# Patient Record
Sex: FEMALE | Race: BLACK OR AFRICAN AMERICAN | NOT HISPANIC OR LATINO | Employment: OTHER | URBAN - METROPOLITAN AREA
[De-identification: names, ages, dates, MRNs, and addresses within clinical notes are randomized per-mention and may not be internally consistent; named-entity substitution may affect disease eponyms.]

---

## 2023-10-08 PROBLEM — J44.9 CHRONIC OBSTRUCTIVE PULMONARY DISEASE (MULTI): Status: ACTIVE | Noted: 2023-10-08

## 2023-10-08 PROBLEM — R92.2 INCONCLUSIVE MAMMOGRAM: Status: ACTIVE | Noted: 2023-10-08

## 2023-10-08 PROBLEM — G89.29 CHRONIC PELVIC PAIN IN FEMALE: Status: ACTIVE | Noted: 2023-10-08

## 2023-10-08 PROBLEM — R91.8 LUNG NODULES: Status: ACTIVE | Noted: 2023-10-08

## 2023-10-08 PROBLEM — R14.0 ABDOMINAL BLOATING: Status: ACTIVE | Noted: 2023-10-08

## 2023-10-08 PROBLEM — J44.9 COPD, MODERATE (MULTI): Status: ACTIVE | Noted: 2023-10-08

## 2023-10-08 PROBLEM — J44.9 OBSTRUCTIVE LUNG DISEASE (MULTI): Status: ACTIVE | Noted: 2023-10-08

## 2023-10-08 PROBLEM — R10.9 ABDOMINAL CRAMPING: Status: ACTIVE | Noted: 2023-10-08

## 2023-10-08 PROBLEM — F17.210 NICOTINE DEPENDENCE, CIGARETTES, UNCOMPLICATED: Status: ACTIVE | Noted: 2023-10-08

## 2023-10-08 PROBLEM — G47.33 OBSTRUCTIVE SLEEP APNEA SYNDROME: Status: ACTIVE | Noted: 2023-10-08

## 2023-10-08 PROBLEM — E66.01 MORBID OBESITY (MULTI): Status: ACTIVE | Noted: 2023-10-08

## 2023-10-08 PROBLEM — R06.02 SOB (SHORTNESS OF BREATH) ON EXERTION: Status: ACTIVE | Noted: 2023-10-08

## 2023-10-08 PROBLEM — S01.312A: Status: ACTIVE | Noted: 2023-10-08

## 2023-10-08 PROBLEM — N84.1 CERVICAL POLYP: Status: ACTIVE | Noted: 2023-10-08

## 2023-10-08 PROBLEM — G47.21 DELAYED SLEEP PHASE SYNDROME: Status: ACTIVE | Noted: 2023-10-08

## 2023-10-08 PROBLEM — A59.01 TRICHOMONAS VAGINITIS: Status: ACTIVE | Noted: 2023-10-08

## 2023-10-08 PROBLEM — N95.0 POST-MENOPAUSAL BLEEDING: Status: ACTIVE | Noted: 2023-10-08

## 2023-10-08 PROBLEM — R29.818 SUSPECTED SLEEP APNEA: Status: ACTIVE | Noted: 2023-10-08

## 2023-10-08 PROBLEM — G47.00 INSOMNIA: Status: ACTIVE | Noted: 2023-10-08

## 2023-10-08 PROBLEM — R06.83 SNORING: Status: ACTIVE | Noted: 2023-10-08

## 2023-10-08 PROBLEM — E04.2 MULTINODULAR THYROID: Status: ACTIVE | Noted: 2023-10-08

## 2023-10-08 PROBLEM — D64.9 ANEMIA: Status: ACTIVE | Noted: 2023-10-08

## 2023-10-08 PROBLEM — K59.09 CHRONIC CONSTIPATION: Status: ACTIVE | Noted: 2023-10-08

## 2023-10-08 PROBLEM — E89.0 STATUS POST PARTIAL THYROIDECTOMY: Status: ACTIVE | Noted: 2023-10-08

## 2023-10-08 PROBLEM — S01.319A TEAR OF EARLOBE: Status: ACTIVE | Noted: 2023-10-08

## 2023-10-08 PROBLEM — J45.909 ASTHMA (HHS-HCC): Status: ACTIVE | Noted: 2023-10-08

## 2023-10-08 PROBLEM — R10.2 CHRONIC PELVIC PAIN IN FEMALE: Status: ACTIVE | Noted: 2023-10-08

## 2023-10-08 RX ORDER — NAPROXEN SODIUM 220 MG/1
TABLET, FILM COATED ORAL
COMMUNITY
Start: 2015-06-29

## 2023-10-08 RX ORDER — HYDROCHLOROTHIAZIDE 12.5 MG/1
12.5 CAPSULE ORAL DAILY
COMMUNITY

## 2023-10-08 RX ORDER — ATORVASTATIN CALCIUM 40 MG/1
TABLET, FILM COATED ORAL
COMMUNITY
Start: 2014-03-21

## 2023-10-08 RX ORDER — METOPROLOL SUCCINATE 100 MG/1
TABLET, EXTENDED RELEASE ORAL
COMMUNITY

## 2023-10-08 RX ORDER — ASPIRIN 325 MG
50000 TABLET, DELAYED RELEASE (ENTERIC COATED) ORAL
COMMUNITY
End: 2024-06-04 | Stop reason: WASHOUT

## 2023-10-08 RX ORDER — IBUPROFEN 600 MG/1
600 TABLET ORAL EVERY 8 HOURS
COMMUNITY
Start: 2018-11-01 | End: 2024-06-04 | Stop reason: WASHOUT

## 2023-10-08 RX ORDER — IPRATROPIUM BROMIDE AND ALBUTEROL SULFATE 2.5; .5 MG/3ML; MG/3ML
SOLUTION RESPIRATORY (INHALATION)
COMMUNITY

## 2023-10-08 RX ORDER — MIRTAZAPINE 15 MG/1
1 TABLET, FILM COATED ORAL NIGHTLY
COMMUNITY
Start: 2018-09-28 | End: 2024-06-04 | Stop reason: WASHOUT

## 2023-10-08 RX ORDER — DOCUSATE SODIUM 100 MG/1
CAPSULE, LIQUID FILLED ORAL
COMMUNITY

## 2023-10-08 RX ORDER — MONTELUKAST SODIUM 10 MG/1
1 TABLET ORAL NIGHTLY
COMMUNITY

## 2023-10-08 RX ORDER — ALBUTEROL SULFATE 0.83 MG/ML
SOLUTION RESPIRATORY (INHALATION)
COMMUNITY
Start: 2015-06-29

## 2023-10-08 RX ORDER — FLUTICASONE PROPIONATE AND SALMETEROL 250; 50 UG/1; UG/1
1 POWDER RESPIRATORY (INHALATION) EVERY 12 HOURS
COMMUNITY
Start: 2020-06-24

## 2023-10-08 RX ORDER — POLYETHYLENE GLYCOL 3350, SODIUM SULFATE ANHYDROUS, SODIUM BICARBONATE, SODIUM CHLORIDE, POTASSIUM CHLORIDE 236; 22.74; 6.74; 5.86; 2.97 G/4L; G/4L; G/4L; G/4L; G/4L
POWDER, FOR SOLUTION ORAL
COMMUNITY
Start: 2020-08-17 | End: 2024-06-04 | Stop reason: WASHOUT

## 2023-10-08 RX ORDER — OMEPRAZOLE 40 MG/1
CAPSULE, DELAYED RELEASE ORAL
COMMUNITY
Start: 2015-09-14

## 2023-10-08 RX ORDER — SIMETHICONE 125 MG
CAPSULE ORAL
COMMUNITY
End: 2024-06-04 | Stop reason: WASHOUT

## 2023-10-08 RX ORDER — DIPHENHYDRAMINE HCL 25 MG
25 CAPSULE ORAL 2 TIMES DAILY PRN
COMMUNITY

## 2023-10-08 RX ORDER — DULOXETIN HYDROCHLORIDE 60 MG/1
CAPSULE, DELAYED RELEASE ORAL
COMMUNITY
Start: 2016-12-15

## 2023-10-08 RX ORDER — INDOMETHACIN 50 MG/1
CAPSULE ORAL
COMMUNITY
Start: 2019-12-02 | End: 2024-06-04 | Stop reason: WASHOUT

## 2023-10-08 RX ORDER — BACITRACIN 500 [USP'U]/G
OINTMENT TOPICAL
COMMUNITY
End: 2024-06-04 | Stop reason: WASHOUT

## 2023-10-08 RX ORDER — FERROUS SULFATE 325(65) MG
TABLET ORAL
COMMUNITY
Start: 2020-08-17

## 2023-10-08 RX ORDER — FOLIC ACID 1 MG/1
TABLET ORAL
COMMUNITY

## 2023-10-08 RX ORDER — RANOLAZINE 500 MG/1
TABLET, EXTENDED RELEASE ORAL
COMMUNITY

## 2023-10-08 RX ORDER — DOXEPIN HYDROCHLORIDE 10 MG/1
10 CAPSULE ORAL NIGHTLY
COMMUNITY
Start: 2018-06-28 | End: 2024-06-04 | Stop reason: WASHOUT

## 2023-10-08 RX ORDER — TIOTROPIUM BROMIDE 18 UG/1
1 CAPSULE ORAL; RESPIRATORY (INHALATION)
COMMUNITY
Start: 2020-06-24 | End: 2024-06-04 | Stop reason: WASHOUT

## 2023-10-08 RX ORDER — HYDROCODONE BITARTRATE AND ACETAMINOPHEN 10; 325 MG/1; MG/1
1 TABLET ORAL EVERY 6 HOURS PRN
COMMUNITY
End: 2023-10-09 | Stop reason: ALTCHOICE

## 2023-10-08 RX ORDER — CALCIUM CARBONATE 500(1250)
1 TABLET ORAL DAILY
COMMUNITY
End: 2024-06-04 | Stop reason: WASHOUT

## 2023-10-08 RX ORDER — BUSPIRONE HYDROCHLORIDE 5 MG/1
TABLET ORAL
COMMUNITY
Start: 2017-10-11 | End: 2024-06-04 | Stop reason: WASHOUT

## 2023-10-10 ENCOUNTER — ANESTHESIA (OUTPATIENT)
Dept: GASTROENTEROLOGY | Facility: HOSPITAL | Age: 68
End: 2023-10-10
Payer: COMMERCIAL

## 2023-10-10 ENCOUNTER — ANESTHESIA EVENT (OUTPATIENT)
Dept: GASTROENTEROLOGY | Facility: HOSPITAL | Age: 68
End: 2023-10-10
Payer: COMMERCIAL

## 2023-10-10 ENCOUNTER — HOSPITAL ENCOUNTER (OUTPATIENT)
Dept: GASTROENTEROLOGY | Facility: HOSPITAL | Age: 68
Discharge: HOME | End: 2023-10-10
Payer: COMMERCIAL

## 2023-10-10 VITALS
HEART RATE: 98 BPM | DIASTOLIC BLOOD PRESSURE: 71 MMHG | RESPIRATION RATE: 19 BRPM | BODY MASS INDEX: 33.31 KG/M2 | HEIGHT: 63 IN | TEMPERATURE: 98.2 F | SYSTOLIC BLOOD PRESSURE: 124 MMHG | WEIGHT: 188 LBS | OXYGEN SATURATION: 100 %

## 2023-10-10 DIAGNOSIS — Z86.010 PERSONAL HISTORY OF COLONIC POLYPS: ICD-10-CM

## 2023-10-10 PROCEDURE — 3700000002 HC GENERAL ANESTHESIA TIME - EACH INCREMENTAL 1 MINUTE

## 2023-10-10 PROCEDURE — 2500000005 HC RX 250 GENERAL PHARMACY W/O HCPCS: Performed by: NURSE ANESTHETIST, CERTIFIED REGISTERED

## 2023-10-10 PROCEDURE — 3700000001 HC GENERAL ANESTHESIA TIME - INITIAL BASE CHARGE

## 2023-10-10 PROCEDURE — 2500000004 HC RX 250 GENERAL PHARMACY W/ HCPCS (ALT 636 FOR OP/ED): Performed by: NURSE ANESTHETIST, CERTIFIED REGISTERED

## 2023-10-10 PROCEDURE — A45378 PR COLONOSCOPY,DIAGNOSTIC: Performed by: ANESTHESIOLOGY

## 2023-10-10 PROCEDURE — 7100000009 HC PHASE TWO TIME - INITIAL BASE CHARGE

## 2023-10-10 PROCEDURE — A45378 PR COLONOSCOPY,DIAGNOSTIC: Performed by: NURSE ANESTHETIST, CERTIFIED REGISTERED

## 2023-10-10 PROCEDURE — 7100000010 HC PHASE TWO TIME - EACH INCREMENTAL 1 MINUTE

## 2023-10-10 PROCEDURE — 45378 DIAGNOSTIC COLONOSCOPY: CPT | Performed by: INTERNAL MEDICINE

## 2023-10-10 RX ORDER — LIDOCAINE HYDROCHLORIDE 20 MG/ML
INJECTION, SOLUTION EPIDURAL; INFILTRATION; INTRACAUDAL; PERINEURAL AS NEEDED
Status: DISCONTINUED | OUTPATIENT
Start: 2023-10-10 | End: 2023-10-10

## 2023-10-10 RX ORDER — FENTANYL CITRATE 50 UG/ML
INJECTION, SOLUTION INTRAMUSCULAR; INTRAVENOUS AS NEEDED
Status: DISCONTINUED | OUTPATIENT
Start: 2023-10-10 | End: 2023-10-10

## 2023-10-10 RX ORDER — DIPHENHYDRAMINE HYDROCHLORIDE 50 MG/ML
12.5 INJECTION INTRAMUSCULAR; INTRAVENOUS ONCE AS NEEDED
Status: DISCONTINUED | OUTPATIENT
Start: 2023-10-10 | End: 2023-10-20 | Stop reason: HOSPADM

## 2023-10-10 RX ORDER — PROPOFOL 10 MG/ML
INJECTION, EMULSION INTRAVENOUS CONTINUOUS PRN
Status: DISCONTINUED | OUTPATIENT
Start: 2023-10-10 | End: 2023-10-10

## 2023-10-10 RX ORDER — LIDOCAINE HYDROCHLORIDE 10 MG/ML
0.1 INJECTION, SOLUTION EPIDURAL; INFILTRATION; INTRACAUDAL; PERINEURAL ONCE
Status: DISCONTINUED | OUTPATIENT
Start: 2023-10-10 | End: 2023-10-20 | Stop reason: HOSPADM

## 2023-10-10 RX ORDER — ONDANSETRON HYDROCHLORIDE 2 MG/ML
4 INJECTION, SOLUTION INTRAVENOUS ONCE AS NEEDED
Status: DISCONTINUED | OUTPATIENT
Start: 2023-10-10 | End: 2023-10-20 | Stop reason: HOSPADM

## 2023-10-10 RX ORDER — MIDAZOLAM HYDROCHLORIDE 1 MG/ML
INJECTION INTRAMUSCULAR; INTRAVENOUS AS NEEDED
Status: DISCONTINUED | OUTPATIENT
Start: 2023-10-10 | End: 2023-10-10

## 2023-10-10 RX ORDER — OXYCODONE HYDROCHLORIDE 5 MG/1
5 TABLET ORAL EVERY 4 HOURS PRN
Status: DISCONTINUED | OUTPATIENT
Start: 2023-10-10 | End: 2023-10-20 | Stop reason: HOSPADM

## 2023-10-10 RX ORDER — SODIUM CHLORIDE, SODIUM LACTATE, POTASSIUM CHLORIDE, CALCIUM CHLORIDE 600; 310; 30; 20 MG/100ML; MG/100ML; MG/100ML; MG/100ML
100 INJECTION, SOLUTION INTRAVENOUS CONTINUOUS
Status: DISCONTINUED | OUTPATIENT
Start: 2023-10-10 | End: 2023-10-20 | Stop reason: HOSPADM

## 2023-10-10 RX ADMIN — LIDOCAINE HYDROCHLORIDE 60 MG: 20 INJECTION, SOLUTION EPIDURAL; INFILTRATION; INTRACAUDAL; PERINEURAL at 17:30

## 2023-10-10 RX ADMIN — MIDAZOLAM HYDROCHLORIDE 2 MG: 1 INJECTION, SOLUTION INTRAMUSCULAR; INTRAVENOUS at 17:26

## 2023-10-10 RX ADMIN — FENTANYL CITRATE 50 MCG: 50 INJECTION, SOLUTION INTRAMUSCULAR; INTRAVENOUS at 17:30

## 2023-10-10 RX ADMIN — FENTANYL CITRATE 50 MCG: 50 INJECTION, SOLUTION INTRAMUSCULAR; INTRAVENOUS at 17:35

## 2023-10-10 RX ADMIN — PROPOFOL 100 MCG/KG/MIN: 10 INJECTION, EMULSION INTRAVENOUS at 17:28

## 2023-10-10 SDOH — HEALTH STABILITY: MENTAL HEALTH: CURRENT SMOKER: 1

## 2023-10-10 ASSESSMENT — PAIN SCALES - GENERAL
PAINLEVEL_OUTOF10: 0 - NO PAIN
PAIN_LEVEL: 0
PAINLEVEL_OUTOF10: 0 - NO PAIN
PAINLEVEL_OUTOF10: 0 - NO PAIN

## 2023-10-10 ASSESSMENT — COLUMBIA-SUICIDE SEVERITY RATING SCALE - C-SSRS
6. HAVE YOU EVER DONE ANYTHING, STARTED TO DO ANYTHING, OR PREPARED TO DO ANYTHING TO END YOUR LIFE?: NO
1. IN THE PAST MONTH, HAVE YOU WISHED YOU WERE DEAD OR WISHED YOU COULD GO TO SLEEP AND NOT WAKE UP?: NO
2. HAVE YOU ACTUALLY HAD ANY THOUGHTS OF KILLING YOURSELF?: NO

## 2023-10-10 ASSESSMENT — PAIN - FUNCTIONAL ASSESSMENT
PAIN_FUNCTIONAL_ASSESSMENT: 0-10

## 2023-10-10 NOTE — DISCHARGE INSTRUCTIONS

## 2023-10-10 NOTE — ANESTHESIA POSTPROCEDURE EVALUATION
Patient: Nancy Samuel    Procedure Summary       Date: 10/10/23 Room / Location: [facility]    Anesthesia Start: 1726 Anesthesia Stop: 1806    Procedure: COLONOSCOPY Diagnosis: Personal history of colonic polyps    Scheduled Providers: Zaki Adams MD; Rayray Walters MD; Amber Lombardo, RN Responsible Provider: Aydin Turner MD    Anesthesia Type: MAC ASA Status: 2            Anesthesia Type: MAC    Vitals Value Taken Time   /76 10/10/23 1806   Temp 36.3 10/10/23 1806   Pulse 80 10/10/23 1806   Resp 20 10/10/23 1806   SpO2 98 10/10/23 1806       Anesthesia Post Evaluation    Patient location during evaluation: PACU  Patient participation: complete - patient participated  Level of consciousness: awake and alert  Pain score: 0  Cardiovascular status: acceptable, blood pressure returned to baseline and hemodynamically stable  Respiratory status: acceptable and room air  Hydration status: acceptable        No notable events documented.

## 2023-10-10 NOTE — ANESTHESIA PREPROCEDURE EVALUATION
Patient: Nancy Samuel    Procedure Information       Date/Time: 10/10/23 1530    Scheduled providers: Addison Price MD; Rayray Walters MD; Luis Felipe Stoner RN    Procedure: COLONOSCOPY    Location: [facility]          67 yo F hx COPD (controlled), HTN, OA, TIA, depression, tobacco abuse s/f colonoscopy.  No issues with anethesia in past    Relevant Problems   Endocrine   (+) Morbid obesity (CMS/HCC)   (+) Multinodular thyroid      Pulmonary   (+) Asthma   (+) COPD, moderate (CMS/HCC)   (+) Chronic obstructive pulmonary disease (CMS/HCC)   (+) Lung nodules   (+) Obstructive sleep apnea syndrome   (+) SOB (shortness of breath) on exertion      Hematology   (+) Anemia      Infectious Disease   (+) Trichomonas vaginitis       Clinical information reviewed:   Tobacco  Allergies  Meds   Med Hx  Surg Hx  OB Status  Fam Hx  Soc   Hx        NPO Detail:  NPO/Void Status  Carbonhydrate Drink Given Prior to Surgery? : N  Date of Last Liquid: 10/10/23  Time of Last Liquid: 1100  Date of Last Solid: 10/09/23  Time of Last Solid: 1000  Last Intake Type: Light meal  Time of Last Void: 1500         Physical Exam    Airway  Mallampati: III  TM distance: >3 FB  Neck ROM: full  Comments: thick   Cardiovascular   Rate: normal     Dental   Comments: Poor dentition.  Nothing loose   Pulmonary   Breath sounds clear to auscultation     Abdominal            Anesthesia Plan    ASA 2     MAC     The patient is a current smoker.    intravenous induction   Anesthetic plan and risks discussed with patient.    Plan discussed with CRNA.

## 2023-10-10 NOTE — PERIOPERATIVE NURSING NOTE
Discharge instructions reviewed with patient and responsible adult via phone call. Any/ all questions reviewed. Patient dressed with assistance. Patient t transferred off the floor via wheelchair.

## 2023-10-10 NOTE — H&P
History Of Present Illness  Nancy Samuel is a 68 y.o. female presenting with colonoscopy for  follow up of colon polyps     Past Medical History  Past Medical History:   Diagnosis Date   • Other disorders of lung     Lung trouble   • Personal history of other diseases of the circulatory system     History of hypertension   • Personal history of other diseases of the musculoskeletal system and connective tissue     History of arthritis   • Personal history of other diseases of the respiratory system     History of chronic obstructive lung disease   • Personal history of other mental and behavioral disorders     History of depression   • Personal history of other specified conditions     History of heartburn   • Personal history of other specified conditions     History of edema   • Polyp of cervix uteri 11/04/2014    Cervical polyp       Surgical History  Past Surgical History:   Procedure Laterality Date   • MR HEAD ANGIO WO IV CONTRAST  12/6/2013    MR HEAD ANGIO WO IV CONTRAST 12/6/2013 [facility]Y [facility]GAC[facility]   • MR NECK ANGIO WO IV CONTRAST  12/6/2013    MR NECK ANGIO WO IV CONTRAST 12/6/2013 [facility] EMERGENCY LEGAC[facility]   • OTHER SURGICAL HISTORY  11/01/2022    Dilation and curettage   • OTHER SURGICAL HISTORY  11/01/2022    Tubal ligation bilateral   • OTHER SURGICAL HISTORY  02/27/2019    Umbilical hernia repair   • SPINAL FUSION  08/12/2014    Spinal Arthrodesis        Social History  She reports that she has been smoking cigarettes. She has a 7.50 pack-year smoking history. She has never used smokeless tobacco. She reports current alcohol use of about 4.0 standard drinks of alcohol per week. She reports that she does not use drugs.    Family History  Family History   Problem Relation Name Age of Onset   • Other (Back Problems) Mother     • Stomach cancer Father     • Other (Neck Problems) Father     • Heart disease Sister          Allergies  House dust, Iodine, Latex, Morphine, Penicillins, Propoxyphene, and  "Valsartan         Physical Exam  Eyes:      Conjunctiva/sclera: Conjunctivae normal.   Cardiovascular:      Rate and Rhythm: Normal rate.   Pulmonary:      Effort: Pulmonary effort is normal.      Breath sounds: Normal breath sounds.   Abdominal:      Palpations: Abdomen is soft.   Skin:     General: Skin is warm.   Neurological:      Mental Status: She is alert and oriented to person, place, and time.   Psychiatric:         Mood and Affect: Mood normal.        Last Recorded Vitals  Blood pressure 131/80, pulse 95, temperature 36.2 °C (97.2 °F), temperature source Temporal, resp. rate 18, height 1.6 m (5' 3\"), weight 85.3 kg (188 lb), SpO2 100 %.    Relevant Results             Assessment/Plan   Active Problems:  There are no active Hospital Problems.      History of hyperplastic colon polyps ([facility] 4/28/08 which revealed rectal hyperplastic polyp and internal hemorrhoids with repeat colonoscopy recommended in 5 years for unknown reasons; Dr. Leo 5/4/15 which revealed diverticulosis in sigmoid and descending colon with no polyps with repeat colonoscopy recommended in 10 years (2025); and, Dr. Sal 7/6/18 which revealed 2 rectal hyperplastic polyps with repeat colonoscopy recommended in 5 to 10 years (unclear why this particular date range rather than 10 years).       Colonoscopy.      Addison Price MD    "

## 2023-10-11 ASSESSMENT — PAIN SCALES - GENERAL: PAINLEVEL_OUTOF10: 0 - NO PAIN

## 2023-11-10 ENCOUNTER — HOSPITAL ENCOUNTER (OUTPATIENT)
Dept: RADIOLOGY | Facility: HOSPITAL | Age: 68
Setting detail: SURGERY ADMIT
Discharge: HOME | End: 2023-11-10
Payer: COMMERCIAL

## 2023-11-10 DIAGNOSIS — R92.8 OTHER ABNORMAL AND INCONCLUSIVE FINDINGS ON DIAGNOSTIC IMAGING OF BREAST: ICD-10-CM

## 2023-11-10 PROCEDURE — 76642 ULTRASOUND BREAST LIMITED: CPT | Mod: 50

## 2023-11-10 PROCEDURE — 76642 ULTRASOUND BREAST LIMITED: CPT | Mod: BILATERAL PROCEDURE | Performed by: RADIOLOGY

## 2023-11-10 PROCEDURE — 76982 USE 1ST TARGET LESION: CPT

## 2023-12-13 NOTE — PROGRESS NOTES
"Division of Minimally Invasive Gynecologic Surgery  [facility]    12/13/23 Gynecology Visit    CC: Annual/PMB follow up    Nancy Samuel is a 68 y.o. presents in follow up for PMB. She underwent D+C, hysteroscopy on 1/12/23. Sampling had no endometrial component.     She denies PMB since D+C. No other concerns today.     Last pap: 2022 negative/negative   Last mammogram: 10/2023 BIRADs 3, 6 month follow up recommended   Last colonoscopy: 10/2023 unremarkable, repeat recommended in 10 years   Recent CMP WNL, CBC w/ Hgb 11.3   Recent TSH WNL, A1C 5.2%     PMHx, PSHx, SHx, Allergies, and Medications updated in Epic.    ROS: reviewed and negative    PE: Ht 1.6 m (5' 3\")   Wt 86.6 kg (191 lb)   BMI 33.83 kg/m²    Constitutional:  No acute distress, well-nourished and well-developed  HEENT: EOM grossly intact, MMM, neck supple and with full ROM  Pulm:  Effort normal. No accessory muscle usage.  No respiratory distress.  :  - EGBUS: grossly WNL  - Speculum: vaginal and cervical mucosa grossly WNL, no trauma or lesions  Neurological:  She is alert and oriented to person place and time.  Skin: Warm, no pallor.  Psychiatric:  She has normal mood and affect.    A/P: Nancy Samuel is a 68 y.o. presents in follow up for PMB. She underwent D+C, hysteroscopy on 1/12/23. Sampling had no endometrial component.  - Doing well, no concerns today  - Up to date on screening, aware of need for follow up breast screening   - Benign pelvic exam   - RTC for annual w/ Dr. Holguin in one year     Denice García MD  Division of Minimally Invasive Gynecologic Surgery  [facility]    "

## 2023-12-20 ENCOUNTER — OFFICE VISIT (OUTPATIENT)
Dept: OBSTETRICS AND GYNECOLOGY | Facility: CLINIC | Age: 68
End: 2023-12-20
Payer: COMMERCIAL

## 2023-12-20 VITALS — BODY MASS INDEX: 33.84 KG/M2 | WEIGHT: 191 LBS | HEIGHT: 63 IN

## 2023-12-20 DIAGNOSIS — N95.0 POST-MENOPAUSAL BLEEDING: Primary | ICD-10-CM

## 2023-12-20 DIAGNOSIS — Z00.00 HEALTHCARE MAINTENANCE: ICD-10-CM

## 2023-12-20 PROCEDURE — 1159F MED LIST DOCD IN RCRD: CPT | Performed by: STUDENT IN AN ORGANIZED HEALTH CARE EDUCATION/TRAINING PROGRAM

## 2023-12-20 PROCEDURE — 99214 OFFICE O/P EST MOD 30 MIN: CPT | Performed by: STUDENT IN AN ORGANIZED HEALTH CARE EDUCATION/TRAINING PROGRAM

## 2023-12-20 PROCEDURE — 1125F AMNT PAIN NOTED PAIN PRSNT: CPT | Performed by: STUDENT IN AN ORGANIZED HEALTH CARE EDUCATION/TRAINING PROGRAM

## 2023-12-20 PROCEDURE — 3008F BODY MASS INDEX DOCD: CPT | Performed by: STUDENT IN AN ORGANIZED HEALTH CARE EDUCATION/TRAINING PROGRAM

## 2023-12-20 ASSESSMENT — PAIN SCALES - GENERAL: PAINLEVEL: 7

## 2023-12-22 ENCOUNTER — APPOINTMENT (OUTPATIENT)
Dept: OBSTETRICS AND GYNECOLOGY | Facility: CLINIC | Age: 68
End: 2023-12-22
Payer: COMMERCIAL

## 2024-01-04 ENCOUNTER — LAB (OUTPATIENT)
Dept: LAB | Facility: LAB | Age: 69
End: 2024-01-04
Payer: COMMERCIAL

## 2024-01-04 DIAGNOSIS — I44.0 ATRIOVENTRICULAR BLOCK, FIRST DEGREE: ICD-10-CM

## 2024-01-04 DIAGNOSIS — E11.59 HIGH BLOOD PRESSURE ASSOCIATED WITH DIABETES (MULTI): ICD-10-CM

## 2024-01-04 DIAGNOSIS — I15.2 HIGH BLOOD PRESSURE ASSOCIATED WITH DIABETES (MULTI): ICD-10-CM

## 2024-01-04 DIAGNOSIS — E55.9 VITAMIN D DEFICIENCY, UNSPECIFIED: ICD-10-CM

## 2024-01-04 DIAGNOSIS — I11.9 HYPERTENSIVE HEART DISEASE WITHOUT HEART FAILURE: Primary | ICD-10-CM

## 2024-01-04 LAB
25(OH)D3 SERPL-MCNC: 39 NG/ML (ref 30–100)
ALBUMIN SERPL BCP-MCNC: 4.2 G/DL (ref 3.4–5)
ALP SERPL-CCNC: 74 U/L (ref 33–136)
ALT SERPL W P-5'-P-CCNC: 13 U/L (ref 7–45)
ANION GAP SERPL CALC-SCNC: 11 MMOL/L (ref 10–20)
AST SERPL W P-5'-P-CCNC: 14 U/L (ref 9–39)
BILIRUB SERPL-MCNC: 0.3 MG/DL (ref 0–1.2)
BUN SERPL-MCNC: 14 MG/DL (ref 6–23)
CALCIUM SERPL-MCNC: 9.4 MG/DL (ref 8.6–10.6)
CHLORIDE SERPL-SCNC: 101 MMOL/L (ref 98–107)
CHOLEST SERPL-MCNC: 88 MG/DL (ref 0–199)
CHOLESTEROL/HDL RATIO: 2
CO2 SERPL-SCNC: 32 MMOL/L (ref 21–32)
CREAT SERPL-MCNC: 0.75 MG/DL (ref 0.5–1.05)
ERYTHROCYTE [DISTWIDTH] IN BLOOD BY AUTOMATED COUNT: 13.2 % (ref 11.5–14.5)
EST. AVERAGE GLUCOSE BLD GHB EST-MCNC: 120 MG/DL
GFR SERPL CREATININE-BSD FRML MDRD: 87 ML/MIN/1.73M*2
GLUCOSE SERPL-MCNC: 93 MG/DL (ref 74–99)
HBA1C MFR BLD: 5.8 %
HCT VFR BLD AUTO: 33.8 % (ref 36–46)
HDLC SERPL-MCNC: 43 MG/DL
HGB BLD-MCNC: 10.6 G/DL (ref 12–16)
IRON SERPL-MCNC: 57 UG/DL (ref 35–150)
LDLC SERPL CALC-MCNC: 19 MG/DL
MCH RBC QN AUTO: 31.4 PG (ref 26–34)
MCHC RBC AUTO-ENTMCNC: 31.4 G/DL (ref 32–36)
MCV RBC AUTO: 100 FL (ref 80–100)
NON HDL CHOLESTEROL: 45 MG/DL (ref 0–149)
NRBC BLD-RTO: 0 /100 WBCS (ref 0–0)
PLATELET # BLD AUTO: 421 X10*3/UL (ref 150–450)
POTASSIUM SERPL-SCNC: 4.4 MMOL/L (ref 3.5–5.3)
PROT SERPL-MCNC: 7.2 G/DL (ref 6.4–8.2)
RBC # BLD AUTO: 3.38 X10*6/UL (ref 4–5.2)
SODIUM SERPL-SCNC: 140 MMOL/L (ref 136–145)
TRIGL SERPL-MCNC: 131 MG/DL (ref 0–149)
TSH SERPL-ACNC: 3.93 MIU/L (ref 0.44–3.98)
VLDL: 26 MG/DL (ref 0–40)
WBC # BLD AUTO: 8.3 X10*3/UL (ref 4.4–11.3)

## 2024-01-04 PROCEDURE — 83036 HEMOGLOBIN GLYCOSYLATED A1C: CPT

## 2024-01-04 PROCEDURE — 80053 COMPREHEN METABOLIC PANEL: CPT

## 2024-01-04 PROCEDURE — 84443 ASSAY THYROID STIM HORMONE: CPT

## 2024-01-04 PROCEDURE — 80061 LIPID PANEL: CPT

## 2024-01-04 PROCEDURE — 85027 COMPLETE CBC AUTOMATED: CPT

## 2024-01-04 PROCEDURE — 83540 ASSAY OF IRON: CPT

## 2024-01-04 PROCEDURE — 36415 COLL VENOUS BLD VENIPUNCTURE: CPT

## 2024-01-04 PROCEDURE — 82306 VITAMIN D 25 HYDROXY: CPT

## 2024-05-16 ENCOUNTER — HOSPITAL ENCOUNTER (OUTPATIENT)
Dept: RADIOLOGY | Facility: HOSPITAL | Age: 69
Discharge: HOME | End: 2024-05-16
Payer: COMMERCIAL

## 2024-05-16 VITALS — WEIGHT: 190.92 LBS | HEIGHT: 63 IN | BODY MASS INDEX: 33.83 KG/M2

## 2024-05-16 DIAGNOSIS — Z12.31 ENCOUNTER FOR SCREENING MAMMOGRAM FOR MALIGNANT NEOPLASM OF BREAST: ICD-10-CM

## 2024-05-16 DIAGNOSIS — R92.8 OTHER ABNORMAL AND INCONCLUSIVE FINDINGS ON DIAGNOSTIC IMAGING OF BREAST: ICD-10-CM

## 2024-05-16 PROCEDURE — G0279 TOMOSYNTHESIS, MAMMO: HCPCS | Performed by: STUDENT IN AN ORGANIZED HEALTH CARE EDUCATION/TRAINING PROGRAM

## 2024-05-16 PROCEDURE — 77066 DX MAMMO INCL CAD BI: CPT | Performed by: STUDENT IN AN ORGANIZED HEALTH CARE EDUCATION/TRAINING PROGRAM

## 2024-05-16 PROCEDURE — 76982 USE 1ST TARGET LESION: CPT | Mod: 59

## 2024-05-16 PROCEDURE — 77062 BREAST TOMOSYNTHESIS BI: CPT

## 2024-05-16 PROCEDURE — 76642 ULTRASOUND BREAST LIMITED: CPT | Performed by: STUDENT IN AN ORGANIZED HEALTH CARE EDUCATION/TRAINING PROGRAM

## 2024-05-16 PROCEDURE — 76642 ULTRASOUND BREAST LIMITED: CPT | Mod: 50

## 2024-05-22 ENCOUNTER — HOSPITAL ENCOUNTER (OUTPATIENT)
Dept: RESPIRATORY THERAPY | Facility: CLINIC | Age: 69
Discharge: HOME | End: 2024-05-22
Payer: COMMERCIAL

## 2024-05-22 DIAGNOSIS — R06.02 SOB (SHORTNESS OF BREATH): ICD-10-CM

## 2024-05-22 PROCEDURE — 94060 EVALUATION OF WHEEZING: CPT

## 2024-05-22 PROCEDURE — 94727 GAS DIL/WSHOT DETER LNG VOL: CPT

## 2024-05-22 PROCEDURE — 94729 DIFFUSING CAPACITY: CPT

## 2024-05-23 ENCOUNTER — HOSPITAL ENCOUNTER (OUTPATIENT)
Dept: RADIOLOGY | Facility: HOSPITAL | Age: 69
Discharge: HOME | End: 2024-05-23
Payer: COMMERCIAL

## 2024-05-23 DIAGNOSIS — F17.210 NICOTINE DEPENDENCE, CIGARETTES, UNCOMPLICATED: ICD-10-CM

## 2024-05-23 DIAGNOSIS — J44.1 CHRONIC OBSTRUCTIVE PULMONARY DISEASE WITH (ACUTE) EXACERBATION (MULTI): ICD-10-CM

## 2024-05-23 DIAGNOSIS — R06.02 SOB (SHORTNESS OF BREATH): ICD-10-CM

## 2024-05-23 PROCEDURE — 71250 CT THORAX DX C-: CPT

## 2024-05-23 PROCEDURE — 71250 CT THORAX DX C-: CPT | Performed by: RADIOLOGY

## 2024-05-24 ENCOUNTER — HOSPITAL ENCOUNTER (OUTPATIENT)
Dept: RADIOLOGY | Facility: HOSPITAL | Age: 69
End: 2024-05-24
Payer: COMMERCIAL

## 2024-06-03 NOTE — PROGRESS NOTES
Nancy Samuel female   1955 68 y.o.   [id number]      Chief Complaint  New patient, abnormal breast imaging     History Of Present Illness  Nancy Samuel is a pleasant 68 y.o. AA female presenting for follow up on abnormal imaging. She denies breast biopsy or surgery. She denies family history breast cancer.      BREAST IMAGIN2023 Bilateral screening mammogram, BI-RADS Category 0, Bilateral breast masses, right breast asymmetry and left breast focal   Asymmetry. 3/7/2023  Bilateral diagnostic mammogram and ultrasound, BI-RADS Category 3,  RIGHT: 10:00 5 cm from the nipple, 0.4 x 0.5 x  0.3 cm, soft oval, circumscribed, hypoechoic mass,  probably benign and correlates with the right breast mass on mammogram.  Left breast: 12:00 3 cm from the nipple, 0.7 x 0.8 x  0.3 cm an oval, circumscribed, hypoechoic mass is It is avascular and soft on elastography, It is probably  benign and correlates with the left breast mass on mammogram. Interval resolution of right breast asymmetry and left breast focal asymmetry.    11/10/2023 Bilateral breast ultrasound, BI-RADS Category 3, Stable probably benign bilateral breast masses. Recommend a 2nd six-month follow-up bilateral breast ultrasound in May 2024. The patient is due for her annual screening mammography in 2024.     2024 Bilateral diagnostic mammogram, BI-RADS Category 3, Stable probably benign bilateral breast masses documenting 1 year of  stability. Final short-term follow-up ultrasound in 1 year is recommended at the time of patient's bilateral annual mammogram.           REPRODUCTIVE HISTORY: menarche age unknown, , first birth age 17, did not breastfeed ,no  OCP's, scattered fibroglandular tissue                                    FAMILY CANCER HISTORY:   Father: Stomach cancer     Surgical History  She has a past surgical history that includes Spinal fusion (2014); Other surgical history (2022); Other surgical history  (11/01/2022); MR angio head wo IV contrast (12/6/2013); MR angio neck wo IV contrast (12/6/2013); and Other surgical history (02/27/2019).     Social History  She reports that she has been smoking cigarettes. She has a 7.5 pack-year smoking history. She has never used smokeless tobacco. She reports current alcohol use of about 4.0 standard drinks of alcohol per week. She reports that she does not use drugs.    Family History  Family History   Problem Relation Name Age of Onset    Other (Back Problems) Mother      Stomach cancer Father      Other (Neck Problems) Father      Heart disease Sister          Allergies  House dust, Iodine, Latex, Morphine, Penicillins, Propoxyphene, and Valsartan    Medications  Current Outpatient Medications   Medication Instructions    albuterol 2.5 mg /3 mL (0.083 %) nebulizer solution USE 1 UNIT DOSE EVERY 4-6 HOURS AS NEEDED FOR WHEEZING .    amLODIPine (NORVASC) 2.5 mg, oral, Daily    aspirin 81 mg chewable tablet oral    atorvastatin (Lipitor) 40 mg tablet oral    bacitracin 500 unit/gram ointment Bacitracin 500 UNIT/GM External Ointment   Refills: 0       Active    busPIRone (Buspar) 5 mg tablet TAKE 1 TABLET BY MOUTH EVERY DAY FOR 14 DAYS THEN INCREASE TO 2 TIMES    calcium carbonate (Oyster Shell Calcium 500) 500 mg calcium (1,250 mg) tablet 1 tablet, oral, Daily    cholecalciferol (VITAMIN D-3) 50,000 Units, oral, Once Weekly    cyanocobalamin (Vitamin B-12) 50 mcg tablet 1 tablet, oral, Daily    cyclobenzaprine (Flexeril) 10 mg tablet     diclofenac sodium 1 % kit Topical, 2 times daily PRN    diphenhydrAMINE (BENADRYL) 25 mg, oral, 2 times daily PRN    docusate sodium (Colace) 100 mg capsule oral    doxepin (SINEQUAN) 10 mg, oral, Nightly    DULoxetine (Cymbalta) 60 mg DR capsule oral    ferrous sulfate 325 (65 Fe) MG tablet oral    fluticasone propion-salmeteroL (Wixela Inhub) 250-50 mcg/dose diskus inhaler 1 puff, inhalation, Every 12 hours    folic acid (Folvite) 1 mg  tablet oral    hydroCHLOROthiazide (MICROZIDE) 12.5 mg, oral, Daily    ibuprofen 600 mg, oral, Every 8 hours, For 5 days then as needed    indomethacin (Indocin) 50 mg capsule oral    ipratropium-albuteroL (Duo-Neb) 0.5-2.5 mg/3 mL nebulizer solution inhalation    Lyrica 25 mg capsule 1 capsule, Every 24 hours    metoprolol succinate XL (Toprol-XL) 100 mg 24 hr tablet oral    metoprolol succinate XL (TOPROL-XL) 50 mg, oral, Nightly    mirtazapine (Remeron) 15 mg tablet 1 tablet, oral, Nightly    montelukast (Singulair) 10 mg tablet 1 tablet, oral, Nightly    Movantik 12.5 mg, oral, Daily before breakfast    omeprazole (PriLOSEC) 40 mg DR capsule oral    polyethylene glycol-electrolytes (polyethylene glycol) 420 gram solution oral    ranolazine (Ranexa) 500 mg 12 hr tablet oral    simethicone (Mylicon,Gas-X) 125 mg capsule oral    tiotropium (Spiriva with HandiHaler) 18 mcg inhalation capsule 1 capsule, inhalation, Daily RT         REVIEW OF SYSTEMS    Constitutional:  Negative for appetite change, fatigue, fever and unexpected weight change.   HENT:  Negative for ear pain, hearing loss, nosebleeds, sore throat and trouble swallowing.    Eyes:  Negative for discharge, itching and visual disturbance.   Respiratory:  Negative for cough, chest tightness and shortness of breath.    Cardiovascular:  Negative for chest pain, palpitations and leg swelling.   Breast: as indicated in HPI  Gastrointestinal:  Negative for abdominal pain, constipation, diarrhea and nausea.   Endocrine: Negative for cold intolerance and heat intolerance.   Genitourinary:  Negative for dysuria, frequency, hematuria, pelvic pain and vaginal bleeding.   Musculoskeletal:  Negative for arthralgias, back pain, gait problem, joint swelling and myalgias.   Skin:  Negative for color change and rash.   Allergic/Immunologic: Negative for environmental allergies and food allergies.   Neurological:  Negative for dizziness, tremors, speech difficulty,  weakness, numbness and headaches.   Hematological:  Does not bruise/bleed easily.   Psychiatric/Behavioral:  Negative for agitation, dysphoric mood and sleep disturbance. The patient is not nervous/anxious.         Past Medical History  She has a past medical history of Other disorders of lung, Personal history of other diseases of the circulatory system, Personal history of other diseases of the musculoskeletal system and connective tissue, Personal history of other diseases of the respiratory system, Personal history of other mental and behavioral disorders, Personal history of other specified conditions, Personal history of other specified conditions, and Polyp of cervix uteri (11/04/2014).     Physical Exam  Patient is alert and oriented x3 and in a relaxed and appropriate mood. Her gait is steady and hand grasps are equal. Sclera is clear. The breasts are nearly symmetrical. The tissue is soft without palpable abnormalities, discrete nodules or masses. The skin and nipples appear normal. There is no cervical, supraclavicular or axillary lymphadenopathy. Heart rate and rhythm normal, S1 and S2 appreciated. The lungs are clear to auscultation bilaterally. Abdomen is soft and non-tender.       Physical Exam     Last Recorded Vitals  Vitals:    06/04/24 1508   BP: 137/86   Pulse: 96   Resp: 18   Temp: 36.3 °C (97.3 °F)   SpO2: 90%       Relevant Results   Time was spent viewing digital images of the radiology testing with the patient. I explained the results in depth, along with suggested explanation for follow up recommendations based on the testing results. BI-RADS Category 3         Assessment/Plan       Stable clinical exam and imaging, no history breast biopsy or surgery, no family history breast cancer, scattered fibroglandular tissue    Patient Discussion/Summary  Your clinical examination and imaging are stable. Please return in one year for bilateral diagnostic mammogram and ultrasound and office visit or  sooner if you have any problems or concerns.     Your breast imaging was a BI-RADS category 3. This means the findings on imaging are probably benign, but still require short term follow up to monitor stability. The chances of the findings in this category being a cancer are extremely low, less than 1-2%. You will likely need repeat follow up imaging in 6-12 month intervals for up to 2 years until the findings are known to be stable or resolve. This eliminates unnecessary biopsies and allows for early diagnosis should the area change over time.     You can see your health information, review clinical summaries from office visits & test results online when you follow your health with MY  Chart, a personal health record. To sign up go to [email or web address]. If you need assistance with signing up or trouble getting into your account call [facility] Patient Line 24/7 at [phone].    My office phone number is [phone] if you need to get in touch with me or have additional questions or concerns. Thank you for choosing [facility] and trusting me as your healthcare provider. I look forward to seeing you again at your next office visit. I am honored to be a provider on your health care team and I remain dedicated to helping you achieve your health goals.      Aisha Salgado, CONNIE-CNP

## 2024-06-04 ENCOUNTER — HOSPITAL ENCOUNTER (OUTPATIENT)
Dept: RADIOLOGY | Facility: CLINIC | Age: 69
Discharge: HOME | End: 2024-06-04
Payer: COMMERCIAL

## 2024-06-04 ENCOUNTER — OFFICE VISIT (OUTPATIENT)
Dept: SURGICAL ONCOLOGY | Facility: HOSPITAL | Age: 69
End: 2024-06-04
Payer: COMMERCIAL

## 2024-06-04 VITALS
WEIGHT: 186.2 LBS | SYSTOLIC BLOOD PRESSURE: 137 MMHG | TEMPERATURE: 97.3 F | OXYGEN SATURATION: 90 % | BODY MASS INDEX: 32.99 KG/M2 | HEART RATE: 96 BPM | RESPIRATION RATE: 18 BRPM | DIASTOLIC BLOOD PRESSURE: 86 MMHG

## 2024-06-04 DIAGNOSIS — R92.8 ABNORMAL FINDING ON BREAST IMAGING: Primary | ICD-10-CM

## 2024-06-04 DIAGNOSIS — M25.511 PAIN IN RIGHT SHOULDER: ICD-10-CM

## 2024-06-04 PROCEDURE — 73030 X-RAY EXAM OF SHOULDER: CPT | Mod: RIGHT SIDE | Performed by: RADIOLOGY

## 2024-06-04 PROCEDURE — 99213 OFFICE O/P EST LOW 20 MIN: CPT

## 2024-06-04 PROCEDURE — 73030 X-RAY EXAM OF SHOULDER: CPT | Mod: RT

## 2024-06-04 PROCEDURE — 1160F RVW MEDS BY RX/DR IN RCRD: CPT

## 2024-06-04 PROCEDURE — 99203 OFFICE O/P NEW LOW 30 MIN: CPT

## 2024-06-04 PROCEDURE — 1125F AMNT PAIN NOTED PAIN PRSNT: CPT

## 2024-06-04 PROCEDURE — 1159F MED LIST DOCD IN RCRD: CPT

## 2024-06-04 RX ORDER — CYCLOBENZAPRINE HCL 10 MG
TABLET ORAL
COMMUNITY
Start: 2024-06-03

## 2024-06-04 RX ORDER — NALOXEGOL OXALATE 12.5 MG/1
12.5 TABLET, FILM COATED ORAL
COMMUNITY

## 2024-06-04 RX ORDER — METOPROLOL SUCCINATE 50 MG/1
50 TABLET, EXTENDED RELEASE ORAL NIGHTLY
COMMUNITY
Start: 2024-02-26

## 2024-06-04 RX ORDER — AMLODIPINE BESYLATE 2.5 MG/1
2.5 TABLET ORAL DAILY
COMMUNITY
Start: 2024-05-30 | End: 2024-08-28

## 2024-06-04 RX ORDER — PREGABALIN 25 MG/1
1 CAPSULE ORAL EVERY 24 HOURS
COMMUNITY
Start: 2024-05-30

## 2024-06-04 ASSESSMENT — PAIN SCALES - GENERAL: PAINLEVEL: 9

## 2024-06-04 NOTE — PATIENT INSTRUCTIONS
Your clinical examination and imaging are stable. Please return in one year for bilateral diagnostic mammogram and ultrasound and office visit or sooner if you have any problems or concerns.     Your breast imaging was a BI-RADS category 3. This means the findings on imaging are probably benign, but still require short term follow up to monitor stability. The chances of the findings in this category being a cancer are extremely low, less than 1-2%. You will likely need repeat follow up imaging in 6-12 month intervals for up to 2 years until the findings are known to be stable or resolve. This eliminates unnecessary biopsies and allows for early diagnosis should the area change over time.     You can see your health information, review clinical summaries from office visits & test results online when you follow your health with MY  Chart, a personal health record. To sign up go to [email or web address]. If you need assistance with signing up or trouble getting into your account call [employer] Patient Line 24/7 at [phone].    My office phone number is [phone] if you need to get in touch with me or have additional questions or concerns. Thank you for choosing [facility] and trusting me as your healthcare provider. I look forward to seeing you again at your next office visit. I am honored to be a provider on your health care team and I remain dedicated to helping you achieve your health goals.

## 2024-07-18 ENCOUNTER — OFFICE VISIT (OUTPATIENT)
Dept: PULMONOLOGY | Facility: HOSPITAL | Age: 69
End: 2024-07-18
Payer: COMMERCIAL

## 2024-07-18 VITALS
HEART RATE: 98 BPM | TEMPERATURE: 97.6 F | WEIGHT: 185 LBS | BODY MASS INDEX: 32.78 KG/M2 | DIASTOLIC BLOOD PRESSURE: 76 MMHG | SYSTOLIC BLOOD PRESSURE: 115 MMHG

## 2024-07-18 DIAGNOSIS — J30.9 ALLERGIC RHINITIS, UNSPECIFIED SEASONALITY, UNSPECIFIED TRIGGER: ICD-10-CM

## 2024-07-18 DIAGNOSIS — R91.1 LUNG NODULE: ICD-10-CM

## 2024-07-18 DIAGNOSIS — J98.4 RESTRICTIVE LUNG DISEASE: ICD-10-CM

## 2024-07-18 DIAGNOSIS — J44.9 CHRONIC OBSTRUCTIVE PULMONARY DISEASE, UNSPECIFIED COPD TYPE (MULTI): Primary | ICD-10-CM

## 2024-07-18 DIAGNOSIS — G47.33 OBSTRUCTIVE SLEEP APNEA SYNDROME: ICD-10-CM

## 2024-07-18 PROCEDURE — 1159F MED LIST DOCD IN RCRD: CPT | Performed by: NURSE PRACTITIONER

## 2024-07-18 PROCEDURE — 99215 OFFICE O/P EST HI 40 MIN: CPT | Performed by: NURSE PRACTITIONER

## 2024-07-18 PROCEDURE — 1126F AMNT PAIN NOTED NONE PRSNT: CPT | Performed by: NURSE PRACTITIONER

## 2024-07-18 RX ORDER — BUDESONIDE AND FORMOTEROL FUMARATE DIHYDRATE 80; 4.5 UG/1; UG/1
2 AEROSOL RESPIRATORY (INHALATION)
Qty: 10.2 G | Refills: 5 | Status: SHIPPED | OUTPATIENT
Start: 2024-07-18 | End: 2025-01-14

## 2024-07-18 RX ORDER — LORATADINE 10 MG/1
10 TABLET ORAL DAILY
Qty: 90 TABLET | Refills: 3 | Status: SHIPPED | OUTPATIENT
Start: 2024-07-18 | End: 2025-07-18

## 2024-07-18 ASSESSMENT — ENCOUNTER SYMPTOMS
RHINORRHEA: 0
VOMITING: 0
NAUSEA: 0
FEVER: 0
DIZZINESS: 0
EYE PAIN: 0
ABDOMINAL PAIN: 0
HEADACHES: 0
SINUS PRESSURE: 0
DIARRHEA: 0
PALPITATIONS: 0
WEAKNESS: 0
JOINT SWELLING: 0
AGITATION: 0
NUMBNESS: 0
VOICE CHANGE: 0

## 2024-07-18 ASSESSMENT — PATIENT HEALTH QUESTIONNAIRE - PHQ9
2. FEELING DOWN, DEPRESSED OR HOPELESS: NOT AT ALL
1. LITTLE INTEREST OR PLEASURE IN DOING THINGS: NOT AT ALL
SUM OF ALL RESPONSES TO PHQ9 QUESTIONS 1 & 2: 0

## 2024-07-18 ASSESSMENT — PAIN SCALES - GENERAL: PAINLEVEL: 0-NO PAIN

## 2024-07-18 ASSESSMENT — LIFESTYLE VARIABLES: HOW OFTEN DO YOU HAVE A DRINK CONTAINING ALCOHOL: MONTHLY OR LESS

## 2024-07-18 NOTE — PROGRESS NOTES
Patient: Nancy Samuel    [id number]  : 1955 -- AGE 69 y.o.    Provider: CONNIE Zacarias-CNP     Location [facility]   Service Date: 2024              [facility]  Follow up Visit Note      HISTORY OF PRESENT ILLNESS     The patient's referring provider is: No ref. provider found    HISTORY OF PRESENT ILLNESS   Nancy Samuel is a 69 y.o. female who presents to a [facility] for an evaluation with concerns of Lung Nodule, COPD, and Pt. todsay for NPV. I have independently interviewed and examined the patient in the office and reviewed available records.    Current History    On today's visit, the patient reports her PCP said there is a shadow / nodule on her lung. She is concerned about the nodule. She states she uses her advair as needed -- couples times a month. She does not like it related to the taste. She used to used to be on spiriva - states did not notice significant change with stopping this.  She uses PRN albuterol HFA - rarely needs. She has a nebulizer -- uses her albuterol nebulizers a couple of times a month. She takes montelukast daily. She states she will use her advair if she is coughing. She states cough is dry. She will notice wheezing all the time. She has SIDDIQI with rushing or walking a long distance. She denies any SOB at rest. She denies chest pain -- states she has cramping intermittently. She follows with Dr. Pattie Ahumada with cardiology - told she has HF and a leaky valve. She has intermittent throat clearing/post nasal drip - sinuses intermittently bothersome. She takes OTC sinus pills if it gets. PCP has given her claritin in the past - found it helpful. She feels her GERD is under good control with daily omeprazole.      She was previously saw Dr. Rivera/ Dr. Gharibeh.      Previous pulmonary history: She was previously told she has COPD.     Inhalers/nebulized medications:  advair and albuterol     Hospitalization History: She has not been hospitalized over the last year for breathing related problem.    Sleep history: CATALINA - not able to keep CPAP on     ALLERGIES AND MEDICATIONS     ALLERGIES  Allergies   Allergen Reactions    House Dust Unknown    Iodine Unknown    Latex Itching    Morphine Unknown    Penicillins Itching     See pharmacist penicillin allergy assessment note from 1/3/22 for more details.    Propoxyphene Unknown    Valsartan Unknown       MEDICATIONS  Current Outpatient Medications   Medication Sig Dispense Refill    albuterol 2.5 mg /3 mL (0.083 %) nebulizer solution USE 1 UNIT DOSE EVERY 4-6 HOURS AS NEEDED FOR WHEEZING .      amLODIPine (Norvasc) 2.5 mg tablet Take 1 tablet (2.5 mg) by mouth once daily.      aspirin 81 mg chewable tablet Chew.      atorvastatin (Lipitor) 40 mg tablet Take by mouth.      cyanocobalamin (Vitamin B-12) 50 mcg tablet Take 1 tablet (50 mcg) by mouth once daily.      cyclobenzaprine (Flexeril) 10 mg tablet       diphenhydrAMINE (BENADryl) 25 mg capsule Take 1 capsule (25 mg) by mouth 2 times a day as needed.      docusate sodium (Colace) 100 mg capsule Take by mouth.      DULoxetine (Cymbalta) 60 mg DR capsule Take by mouth.      ferrous sulfate 325 (65 Fe) MG tablet Take by mouth.      fluticasone propion-salmeteroL (Wixela Inhub) 250-50 mcg/dose diskus inhaler Inhale 1 puff every 12 hours.      folic acid (Folvite) 1 mg tablet Take by mouth.      hydroCHLOROthiazide (Microzide) 12.5 mg capsule Take 1 capsule (12.5 mg) by mouth once daily.      ipratropium-albuteroL (Duo-Neb) 0.5-2.5 mg/3 mL nebulizer solution Inhale.      Lyrica 25 mg capsule 1 capsule (25 mg) once every 24 hours.      metoprolol succinate XL (Toprol-XL) 100 mg 24 hr tablet Take by mouth.      metoprolol succinate XL (Toprol-XL) 50 mg 24 hr tablet Take 1 tablet (50 mg) by mouth once daily at bedtime.      montelukast (Singulair) 10 mg tablet Take 1 tablet (10 mg) by  mouth once daily at bedtime.      Movantik 12.5 mg tablet Take 12.5 mg by mouth once daily in the morning. Take before meals.      omeprazole (PriLOSEC) 40 mg DR capsule Take by mouth.      ranolazine (Ranexa) 500 mg 12 hr tablet Take by mouth.       No current facility-administered medications for this visit.         PAST HISTORY     PAST MEDICAL HISTORY  - HTN - per cards HFpEF   - COPD   - HLD   - OA   - CATALINA - not able to keep CPAP on   - spine surgery   - GERD   - partial thyroidectomy ?     PAST SURGICAL HISTORY  Past Surgical History:   Procedure Laterality Date    MR HEAD ANGIO WO IV CONTRAST  12/6/2013    MR HEAD ANGIO WO IV CONTRAST 12/6/2013 [facility]    MR NECK ANGIO WO IV CONTRAST  12/6/2013    MR NECK ANGIO WO IV CONTRAST 12/6/2013 [facility]    OTHER SURGICAL HISTORY  11/01/2022    Dilation and curettage    OTHER SURGICAL HISTORY  11/01/2022    Tubal ligation bilateral    OTHER SURGICAL HISTORY  02/27/2019    Umbilical hernia repair    SPINAL FUSION  08/12/2014    Spinal Arthrodesis       IMMUNIZATION HISTORY  Immunization History   Administered Date(s) Administered    Flu vaccine (IIV4), preservative free *Check age/dose* 10/07/2018, 11/01/2019    Hepatitis B vaccine, adult *Check Product/Dose* 04/04/2008, 07/01/2008, 10/15/2008    Influenza, Unspecified 10/16/2007, 10/15/2008, 01/12/2010, 09/29/2011    Influenza, seasonal, injectable 08/28/2014, 09/11/2015    Influenza, seasonal, injectable, preservative free 12/06/2013    MMR vaccine, subcutaneous (MMR II) 07/01/2008    PPD Test 04/04/2008, 03/23/2009    Pfizer Purple Cap SARS-CoV-2 05/26/2021, 06/16/2021    Pneumococcal conjugate vaccine, 20-valent (PREVNAR 20) 10/17/2023    Pneumococcal polysaccharide vaccine, 23-valent, age 2 years and older (PNEUMOVAX 23) 02/27/2010, 10/18/2018    RSV, 60 Years And Older (AREXVY) 11/07/2023    Td (adult), unspecified 04/04/2008    Tdap vaccine, age 7 year and older (BOOSTRIX, ADACEL)  05/04/2012, 12/15/2016    Zoster vaccine, recombinant, adult (SHINGRIX) 11/01/2019, 03/11/2020, 03/29/2021       SOCIAL HISTORY  Smoking: Current smoker 22- current - 6 cigarettes a day - majority of the time 1ppd ~45 pack years   Alcohol: 4 drinks per week   Illicit drugs:  None     OCCUPATIONAL/ENVIRONMENTAL HISTORY  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     FAMILY HISTORY  Asthma - nieces/ nephews    RESULTS/DATA     Pulmonary Function Test Results     6/24/20 - FEV1/ FVC 0.83/ FEV1 1.36 (68%)/ FVC 1.63 (65%)/ TLC 2.90 (69%)/ DLCO 41%     11/10/21 - FEV1/FVC 0.79/ FEV1 1.45 (74% - no BD resp)/ FVC 1.85 (75%)/ TLC 3.16 (75%)/ DLCO 40%     5/22/24 - FEV1/FVC 0.73/ FEV1 1.26 (67%)/ FVC 1.72 (71%)/ TLC 3.23 (77%)/ DLCO 54%     6MWT: 6/24/20 - 480m - 95% on RA     Chest Radiograph     XR chest 2 views   Impression  No acute abnormalities.           Chest CT Scan     5/23/24 - There are smoking-related parenchymal changes with subpleural  reticulation and paraseptal emphysematous changes. Basilar opacities  most likely atelectatic. While smoking-related interstitial changes  can be considered, if there is concern for underlying interstitial  process, dedicated high-resolution imaging with prone positioning can  be performed for better evaluation of these basilar opacities.  2.  6 mm superior segment left lower lobe parenchymal lung nodule new  when compared to a study of 02/05/2021. While this may be post  inflammatory/atelectatic in nature, given the patient's history of  smoking, a three-month low-dose chest CT is recommended for  evaluation.       Echocardiogram     9/2/21 -  The left ventricular systolic function is normal with a 65-70% estimated ejection fraction.   2. Slightly elevated RVSP. RA normal, RV normal size - low normal RV systolic function.       Other testing/ Labs      Eosinophils Absolute (x10E9/L)   Date Value   01/01/2022 0.11     No results found for:  "\"IGE\"    REVIEW OF SYSTEMS     REVIEW OF SYSTEMS  Review of Systems   Constitutional:  Positive for fatigue. Negative for fever.   HENT:  Negative for congestion, postnasal drip, rhinorrhea, sinus pressure and voice change.    Eyes:  Negative for pain and visual disturbance.   Cardiovascular:  Negative for chest pain, palpitations and leg swelling.   Gastrointestinal:  Negative for abdominal pain, diarrhea, nausea and vomiting.   Endocrine: Negative for cold intolerance and heat intolerance.   Musculoskeletal:  Positive for arthralgias, back pain and myalgias. Negative for joint swelling.   Skin:  Negative for rash.   Neurological:  Negative for dizziness, weakness, numbness and headaches.   Psychiatric/Behavioral:  Positive for sleep disturbance. Negative for agitation. The patient is nervous/anxious.          PHYSICAL EXAM     VITAL SIGNS: Temp 36.4 °C (97.6 °F)   Wt 83.9 kg (185 lb)   BMI 32.78 kg/m²      CURRENT WEIGHT: [unfilled]  BMI: [unfilled]  PREVIOUS WEIGHTS:  Wt Readings from Last 3 Encounters:   07/18/24 83.9 kg (185 lb)   06/04/24 84.5 kg (186 lb 3.2 oz)   05/16/24 86.6 kg (190 lb 14.7 oz)       Physical Exam  Vitals reviewed.   Constitutional:       General: She is not in acute distress.     Appearance: Normal appearance. She is not ill-appearing or toxic-appearing.   HENT:      Head: Normocephalic.      Nose: No rhinorrhea.   Cardiovascular:      Rate and Rhythm: Normal rate and regular rhythm.      Heart sounds: Normal heart sounds.   Pulmonary:      Effort: Pulmonary effort is normal. No respiratory distress.      Breath sounds: Normal breath sounds. No stridor. No wheezing, rhonchi or rales.   Abdominal:      General: Abdomen is flat.   Musculoskeletal:         General: Normal range of motion.      Right lower leg: No edema.      Left lower leg: No edema.   Skin:     General: Skin is warm and dry.      Nails: There is no clubbing.   Neurological:      General: No focal deficit present.      Mental " Status: She is alert and oriented to person, place, and time.   Psychiatric:         Mood and Affect: Mood normal.         Behavior: Behavior normal.         Judgment: Judgment normal.       ASSESSMENT/PLAN     Emphysema: PFTs without obstruction from 2021 - slightly restricted. CT with smoking related bronchiolitis and emphysema. Repeat PFTs with improvement. Does not like the the powder in advair   - stop advair   - start symbicort 2 puffs twice daily - rinse mouth out afterwards   - continue albuterol HFA inhaler 2 puffs or albuterol nebulizer treatment every 4-6 hours as needed for shortness of breath   - need to get echo from Dr. Peter office --  if not recent then will get repeat     2. Smoking cessation:    - > 5 minutes smoking cessation counseling     3. Lung nodule: new 6mm nodule - qualifies to [facility], but following up on new nodule.   - will get CT chest in 11/2024     4. CATALINA: has CPAP - having difficulties   - referral to sleep medicine     Thank you for visiting the Pulmonary clinic today!   Return to clinic  6 months after CT chest or sooner if needed   Shelby Sandoval CNP  My office -  [phone]- Hum[employer] is my nurse.   Radiology scheduling [phone]   Appointment scheduling [phone]   Pulmonary function testing - [phone]

## 2024-07-18 NOTE — PATIENT INSTRUCTIONS
Emphysema: PFTs without obstruction from 2021 - slightly restricted. CT with smoking related bronchiolitis and emphysema. Repeat PFTs with improvement. Does not like the the powder in advair   - stop advair   - start symbicort 2 puffs twice daily - rinse mouth out afterwards   - continue albuterol HFA inhaler 2 puffs or albuterol nebulizer treatment every 4-6 hours as needed for shortness of breath   - need to get echo from Dr. Peter office --  if not recent then will get repeat     2. Smoking cessation:    - > 5 minutes smoking cessation counseling     3. Lung nodule: new 6mm nodule   - will get CT chest in 11/2024     4. CATALINA: has CPAP - having difficulties   - referral to sleep medicine     Thank you for visiting the Pulmonary clinic today!   Return to clinic  6 months after CT chest or sooner if needed   Shelby Sandoval CNP  My office -  [phone]- Humera is my nurse.   Radiology scheduling [phone]   Appointment scheduling [phone]   Pulmonary function testing - [phone]

## 2024-07-19 ASSESSMENT — ENCOUNTER SYMPTOMS
BACK PAIN: 1
NERVOUS/ANXIOUS: 1
MYALGIAS: 1
SLEEP DISTURBANCE: 1
FATIGUE: 1
ARTHRALGIAS: 1

## 2024-08-13 ENCOUNTER — APPOINTMENT (OUTPATIENT)
Dept: CARDIOLOGY | Facility: CLINIC | Age: 69
End: 2024-08-13
Payer: COMMERCIAL

## 2024-08-20 ENCOUNTER — HOSPITAL ENCOUNTER (OUTPATIENT)
Dept: CARDIOLOGY | Facility: CLINIC | Age: 69
Discharge: HOME | End: 2024-08-20
Payer: COMMERCIAL

## 2024-08-20 DIAGNOSIS — R06.00 DYSPNEA, UNSPECIFIED: ICD-10-CM

## 2024-08-20 PROCEDURE — 93306 TTE W/DOPPLER COMPLETE: CPT | Performed by: INTERNAL MEDICINE

## 2024-08-20 PROCEDURE — 93306 TTE W/DOPPLER COMPLETE: CPT

## 2024-08-21 LAB
AORTIC VALVE MEAN GRADIENT: 4.6 MMHG
AORTIC VALVE PEAK VELOCITY: 1.48 M/S
AV PEAK GRADIENT: 8.8 MMHG
AVA (PEAK VEL): 2.71 CM2
AVA (VTI): 2.7 CM2
EJECTION FRACTION APICAL 4 CHAMBER: 76
EJECTION FRACTION: 63 %
LEFT ATRIUM VOLUME AREA LENGTH INDEX BSA: 16.6 ML/M2
LEFT VENTRICLE INTERNAL DIMENSION DIASTOLE: 3.5 CM (ref 3.5–6)
LEFT VENTRICULAR OUTFLOW TRACT DIAMETER: 1.94 CM
MITRAL VALVE E/A RATIO: 0.79
RIGHT VENTRICLE FREE WALL PEAK S': 8.08 CM/S
RIGHT VENTRICLE PEAK SYSTOLIC PRESSURE: 30.3 MMHG
TRICUSPID ANNULAR PLANE SYSTOLIC EXCURSION: 1.8 CM

## 2024-10-08 ENCOUNTER — APPOINTMENT (OUTPATIENT)
Dept: SLEEP MEDICINE | Facility: HOSPITAL | Age: 69
End: 2024-10-08
Payer: COMMERCIAL

## 2024-10-10 ENCOUNTER — LAB (OUTPATIENT)
Dept: LAB | Facility: LAB | Age: 69
End: 2024-10-10
Payer: COMMERCIAL

## 2024-10-10 DIAGNOSIS — E11.9 TYPE 2 DIABETES MELLITUS WITHOUT COMPLICATIONS (MULTI): ICD-10-CM

## 2024-10-10 DIAGNOSIS — J44.1 CHRONIC OBSTRUCTIVE PULMONARY DISEASE WITH (ACUTE) EXACERBATION (MULTI): ICD-10-CM

## 2024-10-10 DIAGNOSIS — E78.2 MIXED HYPERLIPIDEMIA: ICD-10-CM

## 2024-10-10 DIAGNOSIS — R53.83 OTHER FATIGUE: ICD-10-CM

## 2024-10-10 DIAGNOSIS — I11.9 HYPERTENSIVE HEART DISEASE WITHOUT HEART FAILURE: Primary | ICD-10-CM

## 2024-10-10 DIAGNOSIS — E55.9 VITAMIN D DEFICIENCY, UNSPECIFIED: ICD-10-CM

## 2024-10-10 LAB
ERYTHROCYTE [DISTWIDTH] IN BLOOD BY AUTOMATED COUNT: 14.5 % (ref 11.5–14.5)
EST. AVERAGE GLUCOSE BLD GHB EST-MCNC: 108 MG/DL
HBA1C MFR BLD: 5.4 %
HCT VFR BLD AUTO: 30.8 % (ref 36–46)
HGB BLD-MCNC: 9.2 G/DL (ref 12–16)
MCH RBC QN AUTO: 28.1 PG (ref 26–34)
MCHC RBC AUTO-ENTMCNC: 29.9 G/DL (ref 32–36)
MCV RBC AUTO: 94 FL (ref 80–100)
NRBC BLD-RTO: 0 /100 WBCS (ref 0–0)
PLATELET # BLD AUTO: 598 X10*3/UL (ref 150–450)
RBC # BLD AUTO: 3.27 X10*6/UL (ref 4–5.2)
WBC # BLD AUTO: 11 X10*3/UL (ref 4.4–11.3)

## 2024-10-10 PROCEDURE — 80061 LIPID PANEL: CPT

## 2024-10-10 PROCEDURE — 83036 HEMOGLOBIN GLYCOSYLATED A1C: CPT

## 2024-10-10 PROCEDURE — 82306 VITAMIN D 25 HYDROXY: CPT

## 2024-10-10 PROCEDURE — 85027 COMPLETE CBC AUTOMATED: CPT

## 2024-10-10 PROCEDURE — 36415 COLL VENOUS BLD VENIPUNCTURE: CPT

## 2024-10-10 PROCEDURE — 84443 ASSAY THYROID STIM HORMONE: CPT

## 2024-10-10 PROCEDURE — 80053 COMPREHEN METABOLIC PANEL: CPT

## 2024-10-11 LAB
25(OH)D3 SERPL-MCNC: 41 NG/ML (ref 30–100)
ALBUMIN SERPL BCP-MCNC: 4.5 G/DL (ref 3.4–5)
ALP SERPL-CCNC: 95 U/L (ref 33–136)
ALT SERPL W P-5'-P-CCNC: 14 U/L (ref 7–45)
ANION GAP SERPL CALC-SCNC: 12 MMOL/L (ref 10–20)
AST SERPL W P-5'-P-CCNC: 18 U/L (ref 9–39)
BILIRUB SERPL-MCNC: 0.4 MG/DL (ref 0–1.2)
BUN SERPL-MCNC: 10 MG/DL (ref 6–23)
CALCIUM SERPL-MCNC: 9.9 MG/DL (ref 8.6–10.6)
CHLORIDE SERPL-SCNC: 99 MMOL/L (ref 98–107)
CHOLEST SERPL-MCNC: 92 MG/DL (ref 0–199)
CHOLESTEROL/HDL RATIO: 2.1
CO2 SERPL-SCNC: 33 MMOL/L (ref 21–32)
CREAT SERPL-MCNC: 0.78 MG/DL (ref 0.5–1.05)
EGFRCR SERPLBLD CKD-EPI 2021: 83 ML/MIN/1.73M*2
GLUCOSE SERPL-MCNC: 98 MG/DL (ref 74–99)
HDLC SERPL-MCNC: 42.9 MG/DL
LDLC SERPL CALC-MCNC: 26 MG/DL
NON HDL CHOLESTEROL: 49 MG/DL (ref 0–149)
POTASSIUM SERPL-SCNC: 4.9 MMOL/L (ref 3.5–5.3)
PROT SERPL-MCNC: 8.4 G/DL (ref 6.4–8.2)
SODIUM SERPL-SCNC: 139 MMOL/L (ref 136–145)
TRIGL SERPL-MCNC: 118 MG/DL (ref 0–149)
TSH SERPL-ACNC: 2.12 MIU/L (ref 0.44–3.98)
VLDL: 24 MG/DL (ref 0–40)

## 2024-11-14 ENCOUNTER — OFFICE VISIT (OUTPATIENT)
Dept: PULMONOLOGY | Facility: HOSPITAL | Age: 69
End: 2024-11-14
Payer: COMMERCIAL

## 2024-11-14 VITALS
TEMPERATURE: 97.3 F | HEART RATE: 94 BPM | WEIGHT: 183 LBS | DIASTOLIC BLOOD PRESSURE: 81 MMHG | BODY MASS INDEX: 32.43 KG/M2 | SYSTOLIC BLOOD PRESSURE: 125 MMHG | OXYGEN SATURATION: 100 %

## 2024-11-14 DIAGNOSIS — R91.1 LUNG NODULE: ICD-10-CM

## 2024-11-14 DIAGNOSIS — J44.9 CHRONIC OBSTRUCTIVE PULMONARY DISEASE, UNSPECIFIED COPD TYPE (MULTI): ICD-10-CM

## 2024-11-14 DIAGNOSIS — G47.33 OBSTRUCTIVE SLEEP APNEA SYNDROME: ICD-10-CM

## 2024-11-14 DIAGNOSIS — J98.4 RESTRICTIVE LUNG DISEASE: Primary | ICD-10-CM

## 2024-11-14 DIAGNOSIS — J30.9 ALLERGIC RHINITIS, UNSPECIFIED SEASONALITY, UNSPECIFIED TRIGGER: ICD-10-CM

## 2024-11-14 PROCEDURE — 99213 OFFICE O/P EST LOW 20 MIN: CPT | Performed by: NURSE PRACTITIONER

## 2024-11-14 PROCEDURE — 4004F PT TOBACCO SCREEN RCVD TLK: CPT | Performed by: NURSE PRACTITIONER

## 2024-11-14 PROCEDURE — 1126F AMNT PAIN NOTED NONE PRSNT: CPT | Performed by: NURSE PRACTITIONER

## 2024-11-14 PROCEDURE — 1159F MED LIST DOCD IN RCRD: CPT | Performed by: NURSE PRACTITIONER

## 2024-11-14 RX ORDER — BUDESONIDE AND FORMOTEROL FUMARATE DIHYDRATE 80; 4.5 UG/1; UG/1
2 AEROSOL RESPIRATORY (INHALATION)
Qty: 10.2 G | Refills: 5 | Status: SHIPPED | OUTPATIENT
Start: 2024-11-14 | End: 2025-05-13

## 2024-11-14 RX ORDER — ALBUTEROL SULFATE 90 UG/1
2 INHALANT RESPIRATORY (INHALATION) EVERY 4 HOURS PRN
Qty: 8 G | Refills: 5 | Status: SHIPPED | OUTPATIENT
Start: 2024-11-14 | End: 2025-11-14

## 2024-11-14 ASSESSMENT — ENCOUNTER SYMPTOMS
DIARRHEA: 0
JOINT SWELLING: 0
VOMITING: 0
AGITATION: 0
ARTHRALGIAS: 1
VOICE CHANGE: 0
ABDOMINAL PAIN: 0
SINUS PRESSURE: 0
PALPITATIONS: 0
BACK PAIN: 1
NERVOUS/ANXIOUS: 1
HEADACHES: 0
EYE PAIN: 0
FATIGUE: 0
RHINORRHEA: 0
FEVER: 0
NUMBNESS: 0
MYALGIAS: 1
NAUSEA: 0
DIZZINESS: 0
WEAKNESS: 0

## 2024-11-14 ASSESSMENT — LIFESTYLE VARIABLES
HOW MANY STANDARD DRINKS CONTAINING ALCOHOL DO YOU HAVE ON A TYPICAL DAY: PATIENT DOES NOT DRINK
HOW OFTEN DO YOU HAVE A DRINK CONTAINING ALCOHOL: NEVER
SKIP TO QUESTIONS 9-10: 1
AUDIT-C TOTAL SCORE: 0
HOW OFTEN DO YOU HAVE SIX OR MORE DRINKS ON ONE OCCASION: NEVER

## 2024-11-14 ASSESSMENT — PAIN SCALES - GENERAL: PAINLEVEL_OUTOF10: 0-NO PAIN

## 2024-11-14 NOTE — PATIENT INSTRUCTIONS
Emphysema: PFTs without obstruction from 2021 - slightly restricted. CT with smoking related bronchiolitis and emphysema. Repeat PFTs with improvement. Does not like the the powder in advair   - continue symbicort 2 puffs twice daily - rinse mouth out afterwards   - continue albuterol HFA inhaler 2 puffs or albuterol nebulizer treatment every 4-6 hours as needed for shortness of breath     2. Smoking cessation:    - > 5 minutes smoking cessation counseling     3. Lung nodule: new 6mm nodule - qualifies to [facility], but following up on new nodule.   - will get CT chest in 11/2024 -- call [phone] to schedule     4. CATALINA: has CPAP - having difficulties   - referral to sleep medicine - call [phone]    Thank you for visiting the Pulmonary clinic today!   Return to clinic 2 months after CT chest or sooner if needed   Shelby Sandoval CNP  My office -  [phone]- Jaclyn is my . Humera is my nurse [phone]. If you don't have both the symbicort and albuterol at home I sent new orders in -- if you have issues getting them call [employer]   Radiology scheduling [phone]   Appointment scheduling [phone]   Pulmonary function testing - [phone]

## 2024-11-14 NOTE — PROGRESS NOTES
Patient: Nancy Samuel    [id number]  : 1955 -- AGE 69 y.o.    Provider: CONNIE Zacarias-CNP     Location [facility]   Service Date: 2024              [facility]  Follow up Visit Note      HISTORY OF PRESENT ILLNESS     The patient's referring provider is: No ref. provider found    HISTORY OF PRESENT ILLNESS   Nancy Samuel is a 69 y.o. female who presents to a [facility] for an evaluation with concerns of Pt. here today for FUV. I have independently interviewed and examined the patient in the office and reviewed available records.    Current History    Since last visit she has had issues with her sciatica the last few days. She has a PRN inhaler - does not know what it is. She is not sure if she got the symbicort I ordered last visit. She is using her PRN inhaler every couple days. She has been taking her montelukast nightly. She denies any cough. She has had wheezing. She has SIDDIQI with going up stairs or walking a long distances. She denies any SOB at rest, allergies, or CP. GERD is under good control on omeprazole. Smoking 5-6 cigarettes a day. Has a CPAP and had been having difficulties - referred to sleep, but has not seen them.     CAT today 14    24: On today's visit, the patient reports her PCP said there is a shadow / nodule on her lung. She is concerned about the nodule. She states she uses her advair as needed -- couples times a month. She does not like it related to the taste. She used to used to be on spiriva - states did not notice significant change with stopping this.  She uses PRN albuterol HFA - rarely needs. She has a nebulizer -- uses her albuterol nebulizers a couple of times a month. She takes montelukast daily. She states she will use her advair if she is coughing. She states cough is dry. She will notice wheezing all the time. She has SIDDIQI with rushing or walking a long  distance. She denies any SOB at rest. She denies chest pain -- states she has cramping intermittently. She follows with Dr. Pattie Ahumada with cardiology - told she has HF and a leaky valve. She has intermittent throat clearing/post nasal drip - sinuses intermittently bothersome. She takes OTC sinus pills if it gets. PCP has given her claritin in the past - found it helpful. She feels her GERD is under good control with daily omeprazole.      She was previously saw Dr. Rivera/ Dr. Gharibeh.      Previous pulmonary history: She was previously told she has COPD.     Inhalers/nebulized medications: advair and albuterol     Hospitalization History: She has not been hospitalized over the last year for breathing related problem.    Sleep history: CATALINA - not able to keep CPAP on     ALLERGIES AND MEDICATIONS     ALLERGIES  Allergies   Allergen Reactions    House Dust Unknown    Iodine Unknown    Latex Itching    Morphine Unknown    Penicillins Itching     See pharmacist penicillin allergy assessment note from 1/3/22 for more details.    Propoxyphene Unknown    Valsartan Unknown       MEDICATIONS  Current Outpatient Medications   Medication Sig Dispense Refill    albuterol 2.5 mg /3 mL (0.083 %) nebulizer solution USE 1 UNIT DOSE EVERY 4-6 HOURS AS NEEDED FOR WHEEZING .      aspirin 81 mg chewable tablet Chew.      atorvastatin (Lipitor) 40 mg tablet Take by mouth.      budesonide-formoteroL (Symbicort) 80-4.5 mcg/actuation inhaler Inhale 2 puffs 2 times a day. Rinse mouth with water after use to reduce aftertaste and incidence of candidiasis. Do not swallow. 10.2 g 5    cyanocobalamin (Vitamin B-12) 50 mcg tablet Take 1 tablet (50 mcg) by mouth once daily.      cyclobenzaprine (Flexeril) 10 mg tablet       diphenhydrAMINE (BENADryl) 25 mg capsule Take 1 capsule (25 mg) by mouth 2 times a day as needed.      docusate sodium (Colace) 100 mg capsule Take by mouth.      DULoxetine (Cymbalta) 60 mg DR capsule Take by mouth.       ferrous sulfate 325 (65 Fe) MG tablet Take by mouth.      folic acid (Folvite) 1 mg tablet Take by mouth.      hydroCHLOROthiazide (Microzide) 12.5 mg capsule Take 1 capsule (12.5 mg) by mouth once daily.      ipratropium-albuteroL (Duo-Neb) 0.5-2.5 mg/3 mL nebulizer solution Inhale.      loratadine (Claritin) 10 mg tablet Take 1 tablet (10 mg) by mouth once daily. Take in the evening before bedtime 90 tablet 3    Lyrica 25 mg capsule 1 capsule (25 mg) once every 24 hours.      metoprolol succinate XL (Toprol-XL) 100 mg 24 hr tablet Take by mouth.      amLODIPine (Norvasc) 2.5 mg tablet Take 1 tablet (2.5 mg) by mouth once daily.      metoprolol succinate XL (Toprol-XL) 50 mg 24 hr tablet Take 1 tablet (50 mg) by mouth once daily at bedtime. (Patient not taking: Reported on 11/14/2024)      montelukast (Singulair) 10 mg tablet Take 1 tablet (10 mg) by mouth once daily at bedtime. (Patient not taking: Reported on 11/14/2024)      Movantik 12.5 mg tablet Take 12.5 mg by mouth once daily in the morning. Take before meals. (Patient not taking: Reported on 11/14/2024)      omeprazole (PriLOSEC) 40 mg DR capsule Take by mouth. (Patient not taking: Reported on 11/14/2024)      ranolazine (Ranexa) 500 mg 12 hr tablet Take by mouth. (Patient not taking: Reported on 11/14/2024)       No current facility-administered medications for this visit.         PAST HISTORY     PAST MEDICAL HISTORY  - HTN - per cards HFpEF   - COPD   - HLD   - OA   - CATALINA - not able to keep CPAP on   - spine surgery   - GERD   - partial thyroidectomy ?     PAST SURGICAL HISTORY  Past Surgical History:   Procedure Laterality Date    MR HEAD ANGIO WO IV CONTRAST  12/6/2013    MR HEAD ANGIO WO IV CONTRAST 12/6/2013 [facility]    MR NECK ANGIO WO IV CONTRAST  12/6/2013    MR NECK ANGIO WO IV CONTRAST 12/6/2013 [facility]    OTHER SURGICAL HISTORY  11/01/2022    Dilation and curettage    OTHER SURGICAL HISTORY  11/01/2022    Tubal  ligation bilateral    OTHER SURGICAL HISTORY  02/27/2019    Umbilical hernia repair    SPINAL FUSION  08/12/2014    Spinal Arthrodesis       IMMUNIZATION HISTORY  Immunization History   Administered Date(s) Administered    Flu vaccine (IIV4), preservative free *Check age/dose* 10/07/2018, 11/01/2019    Flu vaccine, trivalent, preservative free, age 6 months and greater (Fluarix/Fluzone/Flulaval) 12/06/2013    Hepatitis B vaccine, adult *Check Product/Dose* 04/04/2008, 07/01/2008, 10/15/2008    Influenza, Unspecified 10/16/2007, 10/15/2008, 01/12/2010, 09/29/2011    Influenza, seasonal, injectable 08/28/2014, 09/11/2015    MMR vaccine, subcutaneous (MMR II) 07/01/2008    PPD Test 04/04/2008, 03/23/2009    Pfizer Purple Cap SARS-CoV-2 05/26/2021, 06/16/2021    Pneumococcal conjugate vaccine, 20-valent (PREVNAR 20) 10/17/2023    Pneumococcal polysaccharide vaccine, 23-valent, age 2 years and older (PNEUMOVAX 23) 02/27/2010, 10/18/2018    RSV, 60 Years And Older (AREXVY) 11/07/2023    Td (adult), unspecified 04/04/2008    Tdap vaccine, age 7 year and older (BOOSTRIX, ADACEL) 05/04/2012, 12/15/2016    Zoster vaccine, recombinant, adult (SHINGRIX) 11/01/2019, 03/11/2020, 03/29/2021       SOCIAL HISTORY  Smoking: Current smoker 22- current - 6 cigarettes a day - majority of the time 1ppd ~45 pack years   Alcohol: 4 drinks per week   Illicit drugs:  None     OCCUPATIONAL/ENVIRONMENTAL HISTORY  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     FAMILY HISTORY  Asthma - nieces/ nephews    RESULTS/DATA     Pulmonary Function Test Results     6/24/20 - FEV1/ FVC 0.83/ FEV1 1.36 (68%)/ FVC 1.63 (65%)/ TLC 2.90 (69%)/ DLCO 41%     11/10/21 - FEV1/FVC 0.79/ FEV1 1.45 (74% - no BD resp)/ FVC 1.85 (75%)/ TLC 3.16 (75%)/ DLCO 40%     5/22/24 - FEV1/FVC 0.73/ FEV1 1.26 (67%)/ FVC 1.72 (71%)/ TLC 3.23 (77%)/ DLCO 54%     6MWT: 6/24/20 - 480m - 95% on RA     Chest Radiograph     XR chest 2 views  "  Impression  No acute abnormalities.           Chest CT Scan     5/23/24 - There are smoking-related parenchymal changes with subpleural  reticulation and paraseptal emphysematous changes. Basilar opacities  most likely atelectatic. While smoking-related interstitial changes  can be considered, if there is concern for underlying interstitial  process, dedicated high-resolution imaging with prone positioning can  be performed for better evaluation of these basilar opacities.  2.  6 mm superior segment left lower lobe parenchymal lung nodule new  when compared to a study of 02/05/2021. While this may be post  inflammatory/atelectatic in nature, given the patient's history of  smoking, a three-month low-dose chest CT is recommended for  evaluation.       Echocardiogram     9/2/21 -  The left ventricular systolic function is normal with a 65-70% estimated ejection fraction.   2. Slightly elevated RVSP. RA normal, RV normal size - low normal RV systolic function.     8/20/24 -  The left ventricular systolic function is normal, with a visually estimated ejection fraction of 60-65%.   2. Spectral Doppler shows an impaired relaxation pattern of left ventricular diastolic filling.   3. There is normal right ventricular global systolic function.   4. Trace mitral valve regurgitation.   5. Mild tricuspid regurgitation visualized.   6. RVSP within normal limits.   7. The estimated PASP is 30 mmHg.  RA normal size, RV normal size and function       Other testing/ Labs      Eosinophils Absolute (x10E9/L)   Date Value   01/01/2022 0.11     No results found for: \"IGE\"    REVIEW OF SYSTEMS     REVIEW OF SYSTEMS  Review of Systems   Constitutional:  Negative for fatigue and fever.   HENT:  Negative for congestion, postnasal drip, rhinorrhea, sinus pressure and voice change.    Eyes:  Negative for pain and visual disturbance.   Cardiovascular:  Negative for chest pain, palpitations and leg swelling.   Gastrointestinal:  Negative for " abdominal pain, diarrhea, nausea and vomiting.   Endocrine: Negative for cold intolerance and heat intolerance.   Musculoskeletal:  Positive for arthralgias, back pain and myalgias. Negative for joint swelling.   Skin:  Negative for rash.   Neurological:  Negative for dizziness, weakness, numbness and headaches.   Psychiatric/Behavioral:  Negative for agitation. The patient is nervous/anxious.          PHYSICAL EXAM     VITAL SIGNS: /81   Pulse 94   Temp 36.3 °C (97.3 °F)   Wt 83 kg (183 lb)   SpO2 100% Comment: RA  BMI 32.43 kg/m²      CURRENT WEIGHT: [unfilled]  BMI: [unfilled]  PREVIOUS WEIGHTS:  Wt Readings from Last 3 Encounters:   11/14/24 83 kg (183 lb)   07/18/24 83.9 kg (185 lb)   06/04/24 84.5 kg (186 lb 3.2 oz)       Physical Exam  Vitals reviewed.   Constitutional:       General: She is not in acute distress.     Appearance: Normal appearance. She is not ill-appearing or toxic-appearing.   HENT:      Head: Normocephalic.      Nose: No rhinorrhea.   Cardiovascular:      Rate and Rhythm: Normal rate and regular rhythm.      Heart sounds: Normal heart sounds.   Pulmonary:      Effort: Pulmonary effort is normal. No respiratory distress.      Breath sounds: Normal breath sounds. No stridor. No wheezing, rhonchi or rales.   Abdominal:      General: Abdomen is flat.   Musculoskeletal:         General: Normal range of motion.      Right lower leg: No edema.      Left lower leg: No edema.   Skin:     General: Skin is warm and dry.      Nails: There is no clubbing.   Neurological:      General: No focal deficit present.      Mental Status: She is alert and oriented to person, place, and time.   Psychiatric:         Mood and Affect: Mood normal.         Behavior: Behavior normal.         Judgment: Judgment normal.       ASSESSMENT/PLAN     Emphysema: PFTs without obstruction from 2021 - slightly restricted. CT with smoking related bronchiolitis and emphysema. Repeat PFTs with improvement. Does not like the  the powder in advair   - continue symbicort 2 puffs twice daily - rinse mouth out afterwards   - continue albuterol HFA inhaler 2 puffs or albuterol nebulizer treatment every 4-6 hours as needed for shortness of breath     2. Smoking cessation:    - > 5 minutes smoking cessation counseling     3. Lung nodule: new 6mm nodule - qualifies to [facility], but following up on new nodule.   - will get CT chest in 11/2024 -- call [phone] to schedule     4. CATALINA: has CPAP - having difficulties   - referral to sleep medicine - call [phone]    Thank you for visiting the Pulmonary clinic today!   Return to clinic 2 months after CT chest or sooner if needed   Shelby Sandoval CNP  My office -  [phone]- Jaclyn is my . Humera is my nurse [phone].   Radiology scheduling [phone]   Appointment scheduling [phone]   Pulmonary function testing - [phone]

## 2024-11-21 ENCOUNTER — HOSPITAL ENCOUNTER (OUTPATIENT)
Dept: RADIOLOGY | Facility: HOSPITAL | Age: 69
Discharge: HOME | End: 2024-11-21
Payer: COMMERCIAL

## 2024-11-21 ENCOUNTER — HOSPITAL ENCOUNTER (OUTPATIENT)
Dept: RADIOLOGY | Facility: CLINIC | Age: 69
Discharge: HOME | End: 2024-11-21
Payer: COMMERCIAL

## 2024-11-21 DIAGNOSIS — M81.0 AGE-RELATED OSTEOPOROSIS WITHOUT CURRENT PATHOLOGICAL FRACTURE: ICD-10-CM

## 2024-11-21 DIAGNOSIS — R91.1 LUNG NODULE: ICD-10-CM

## 2024-11-21 PROCEDURE — 71250 CT THORAX DX C-: CPT

## 2024-11-21 PROCEDURE — 77080 DXA BONE DENSITY AXIAL: CPT | Performed by: RADIOLOGY

## 2024-11-21 PROCEDURE — 77080 DXA BONE DENSITY AXIAL: CPT

## 2024-11-21 PROCEDURE — 71250 CT THORAX DX C-: CPT | Performed by: RADIOLOGY

## 2025-01-10 DIAGNOSIS — F17.210 CIGARETTE NICOTINE DEPENDENCE WITHOUT COMPLICATION: Primary | ICD-10-CM

## 2025-01-28 ENCOUNTER — APPOINTMENT (OUTPATIENT)
Dept: SLEEP MEDICINE | Facility: HOSPITAL | Age: 70
End: 2025-01-28
Payer: COMMERCIAL

## 2025-03-27 ENCOUNTER — OFFICE VISIT (OUTPATIENT)
Dept: PULMONOLOGY | Facility: HOSPITAL | Age: 70
End: 2025-03-27
Payer: COMMERCIAL

## 2025-03-27 VITALS
SYSTOLIC BLOOD PRESSURE: 94 MMHG | HEART RATE: 90 BPM | OXYGEN SATURATION: 97 % | DIASTOLIC BLOOD PRESSURE: 60 MMHG | TEMPERATURE: 97.6 F | BODY MASS INDEX: 32.78 KG/M2 | WEIGHT: 185 LBS

## 2025-03-27 DIAGNOSIS — G47.33 OBSTRUCTIVE SLEEP APNEA SYNDROME: ICD-10-CM

## 2025-03-27 DIAGNOSIS — J98.4 RESTRICTIVE LUNG DISEASE: Primary | ICD-10-CM

## 2025-03-27 DIAGNOSIS — J44.9 CHRONIC OBSTRUCTIVE PULMONARY DISEASE, UNSPECIFIED COPD TYPE (MULTI): ICD-10-CM

## 2025-03-27 DIAGNOSIS — R91.1 LUNG NODULE: ICD-10-CM

## 2025-03-27 DIAGNOSIS — J30.9 ALLERGIC RHINITIS, UNSPECIFIED SEASONALITY, UNSPECIFIED TRIGGER: ICD-10-CM

## 2025-03-27 PROCEDURE — 99213 OFFICE O/P EST LOW 20 MIN: CPT | Performed by: NURSE PRACTITIONER

## 2025-03-27 PROCEDURE — 4004F PT TOBACCO SCREEN RCVD TLK: CPT | Performed by: NURSE PRACTITIONER

## 2025-03-27 PROCEDURE — 1125F AMNT PAIN NOTED PAIN PRSNT: CPT | Performed by: NURSE PRACTITIONER

## 2025-03-27 PROCEDURE — 1159F MED LIST DOCD IN RCRD: CPT | Performed by: NURSE PRACTITIONER

## 2025-03-27 RX ORDER — BUDESONIDE AND FORMOTEROL FUMARATE DIHYDRATE 80; 4.5 UG/1; UG/1
2 AEROSOL RESPIRATORY (INHALATION)
Qty: 10.2 G | Refills: 5 | Status: SHIPPED | OUTPATIENT
Start: 2025-03-27 | End: 2025-09-23

## 2025-03-27 RX ORDER — FLUTICASONE PROPIONATE 50 MCG
2 SPRAY, SUSPENSION (ML) NASAL DAILY
Qty: 16 G | Refills: 6 | Status: SHIPPED | OUTPATIENT
Start: 2025-03-27 | End: 2025-09-23

## 2025-03-27 RX ORDER — MONTELUKAST SODIUM 10 MG/1
10 TABLET ORAL NIGHTLY
Qty: 90 TABLET | Refills: 3 | Status: SHIPPED | OUTPATIENT
Start: 2025-03-27 | End: 2026-03-27

## 2025-03-27 ASSESSMENT — ENCOUNTER SYMPTOMS
NERVOUS/ANXIOUS: 1
MYALGIAS: 1
HEADACHES: 0
SINUS PRESSURE: 1
VOICE CHANGE: 0
RHINORRHEA: 0
ABDOMINAL PAIN: 0
WEAKNESS: 0
PALPITATIONS: 0
DIZZINESS: 0
ARTHRALGIAS: 0
FATIGUE: 0
NUMBNESS: 0
BACK PAIN: 1
AGITATION: 0
FEVER: 0
NAUSEA: 0
EYE PAIN: 0
DIARRHEA: 0
JOINT SWELLING: 0
VOMITING: 0

## 2025-03-27 ASSESSMENT — PAIN SCALES - GENERAL: PAINLEVEL_OUTOF10: 8

## 2025-03-27 NOTE — PATIENT INSTRUCTIONS
Emphysema: PFTs without obstruction from 2021 - slightly restricted. CT with smoking related bronchiolitis and emphysema. Repeat PFTs with improvement. Does not like the the powder in advair   - continue symbicort 2 puffs twice daily - rinse mouth out afterwards   - continue albuterol HFA inhaler 2 puffs or albuterol nebulizer treatment every 4-6 hours as needed for shortness of breath     2. Smoking cessation:    - > 5 minutes smoking cessation counseling     3. Lung nodule: new 6mm nodule - qualifies to [facility], but following up on new nodule.   - will get CT chest in 11/2025 [phone]     4. CATALINA: has CPAP - having difficulties   - referral to sleep medicine - call [phone]    5. Allergic rhinitis:   - can use loratadine (claritin) 10mg daily or fluticasone (flonase) 1 spray each nostril 1-2 x per day - remember to aim towards your ear -- Loratadine you can buy over the counter     6.   Periapical facial abscess: s/p extractions/ antibiotics at [facility]   - make sure to follow up with dentistry as recommended     Thank you for visiting the Pulmonary clinic today!   Return to clinic 6 months  or sooner if needed   Shelby Sandoval CNP  My office -  [phone]- Jaclyn is my . Humera is my nurse [phone].   Radiology scheduling [phone]   Appointment scheduling [phone]   Pulmonary function testing - [phone]

## 2025-03-27 NOTE — PROGRESS NOTES
Patient: Nancy Samuel    [id number]  : 1955 -- AGE 69 y.o.    Provider: CONNIE Zacarias-CNP     Location [facility]   Service Date: 3/27/2025              [facility]  Follow up Visit Note      HISTORY OF PRESENT ILLNESS     The patient's referring provider is: No ref. provider found    HISTORY OF PRESENT ILLNESS   Nancy Samuel is a 69 y.o. female who presents to a [facility] for an evaluation with concerns of Follow-up, Asthma, and COPD. I have independently interviewed and examined the patient in the office and reviewed available records.    Current History    Since last visit she has been doing ok. She was recently admitted to [facility] in 2025 for facial abscess - s/p extractions, clindamycin -> augmentin. She had a fall 2 weeks ago -- since has had left sided chest/ shoulder pain since then.  She has been using her symbicort twice daily and albuterol none. She only uses her nebulizers when she has a cold or her breathing is really bothersome. She has been taking the montelukast at bedtime. She has SIDDIQI with walking long distances or if she is anxious or up stairs.  She denies any cough, wheezing or SOB at rest. She has occasional runny nose/ congestion/ post nasal drip.  I ordered loratadine PRN last visit - not able to get likely related to it being OTC. GERD is under good control with daily omeprazole. She does not have a regular dentist - does not want to follow up at [facility] because she gets her care here.  She missed her sleep appt - going to get rescheduled.     CAT today 24: Since last visit she has had issues with her sciatica the last few days. She has a PRN inhaler - does not know what it is. She is not sure if she got the symbicort I ordered last visit. She is using her PRN inhaler every couple days. She has been taking her montelukast nightly. She denies any cough. She has had  wheezing. She has SIDDIQI with going up stairs or walking a long distances. She denies any SOB at rest, allergies, or CP. GERD is under good control on omeprazole. Smoking 5-6 cigarettes a day. Has a CPAP and had been having difficulties - referred to sleep, but has not seen them.   CAT today 14    7/18/24: On today's visit, the patient reports her PCP said there is a shadow / nodule on her lung. She is concerned about the nodule. She states she uses her advair as needed -- couples times a month. She does not like it related to the taste. She used to used to be on spiriva - states did not notice significant change with stopping this.  She uses PRN albuterol HFA - rarely needs. She has a nebulizer -- uses her albuterol nebulizers a couple of times a month. She takes montelukast daily. She states she will use her advair if she is coughing. She states cough is dry. She will notice wheezing all the time. She has SIDDIQI with rushing or walking a long distance. She denies any SOB at rest. She denies chest pain -- states she has cramping intermittently. She follows with Dr. Pattie Ahumada with cardiology - told she has HF and a leaky valve. She has intermittent throat clearing/post nasal drip - sinuses intermittently bothersome. She takes OTC sinus pills if it gets. PCP has given her claritin in the past - found it helpful. She feels her GERD is under good control with daily omeprazole.      She was previously saw Dr. Rivera/ Dr. Gharibeh.      Previous pulmonary history: She was previously told she has COPD.     Inhalers/nebulized medications: advair and albuterol     Hospitalization History: She has not been hospitalized over the last year for breathing related problem.    Sleep history: CATALINA - not able to keep CPAP on     ALLERGIES AND MEDICATIONS     ALLERGIES  Allergies   Allergen Reactions    House Dust Unknown    Iodine Unknown    Latex Itching    Morphine Unknown    Penicillins Itching     See pharmacist penicillin allergy  assessment note from 1/3/22 for more details.    Propoxyphene Unknown    Valsartan Unknown       MEDICATIONS  Current Outpatient Medications   Medication Sig Dispense Refill    albuterol (Ventolin HFA) 90 mcg/actuation inhaler Inhale 2 puffs every 4 hours if needed for wheezing or shortness of breath. 8 g 5    albuterol 2.5 mg /3 mL (0.083 %) nebulizer solution USE 1 UNIT DOSE EVERY 4-6 HOURS AS NEEDED FOR WHEEZING .      amLODIPine (Norvasc) 2.5 mg tablet Take 1 tablet (2.5 mg) by mouth once daily.      aspirin 81 mg chewable tablet Chew.      atorvastatin (Lipitor) 40 mg tablet Take by mouth.      budesonide-formoteroL (Symbicort) 80-4.5 mcg/actuation inhaler Inhale 2 puffs 2 times a day. Rinse mouth with water after use to reduce aftertaste and incidence of candidiasis. Do not swallow. 10.2 g 5    cyanocobalamin (Vitamin B-12) 50 mcg tablet Take 1 tablet (50 mcg) by mouth once daily.      cyclobenzaprine (Flexeril) 10 mg tablet       diphenhydrAMINE (BENADryl) 25 mg capsule Take 1 capsule (25 mg) by mouth 2 times a day as needed.      docusate sodium (Colace) 100 mg capsule Take by mouth.      DULoxetine (Cymbalta) 60 mg DR capsule Take by mouth.      ferrous sulfate 325 (65 Fe) MG tablet Take by mouth.      folic acid (Folvite) 1 mg tablet Take by mouth.      hydroCHLOROthiazide (Microzide) 12.5 mg capsule Take 1 capsule (12.5 mg) by mouth once daily.      ipratropium-albuteroL (Duo-Neb) 0.5-2.5 mg/3 mL nebulizer solution Inhale.      loratadine (Claritin) 10 mg tablet Take 1 tablet (10 mg) by mouth once daily. Take in the evening before bedtime 90 tablet 3    Lyrica 25 mg capsule 1 capsule (25 mg) once every 24 hours.      metoprolol succinate XL (Toprol-XL) 100 mg 24 hr tablet Take by mouth.      montelukast (Singulair) 10 mg tablet Take 1 tablet (10 mg) by mouth once daily at bedtime. (Patient not taking: Reported on 11/14/2024)      Movantik 12.5 mg tablet Take 12.5 mg by mouth once daily in the morning.  Take before meals. (Patient not taking: Reported on 11/14/2024)      omeprazole (PriLOSEC) 40 mg DR capsule Take by mouth. (Patient not taking: Reported on 11/14/2024)      ranolazine (Ranexa) 500 mg 12 hr tablet Take by mouth. (Patient not taking: Reported on 11/14/2024)       No current facility-administered medications for this visit.         PAST HISTORY     PAST MEDICAL HISTORY  - HTN - per cards HFpEF   - COPD   - HLD   - OA   - CATALINA - not able to keep CPAP on   - spine surgery   - GERD   - partial thyroidectomy ?     PAST SURGICAL HISTORY  Past Surgical History:   Procedure Laterality Date    MR HEAD ANGIO WO IV CONTRAST  12/6/2013    MR HEAD ANGIO WO IV CONTRAST 12/6/2013 [facility]    MR NECK ANGIO WO IV CONTRAST  12/6/2013    MR NECK ANGIO WO IV CONTRAST 12/6/2013 [facility]    OTHER SURGICAL HISTORY  11/01/2022    Dilation and curettage    OTHER SURGICAL HISTORY  11/01/2022    Tubal ligation bilateral    OTHER SURGICAL HISTORY  02/27/2019    Umbilical hernia repair    SPINAL FUSION  08/12/2014    Spinal Arthrodesis       IMMUNIZATION HISTORY  Immunization History   Administered Date(s) Administered    Flu vaccine (IIV4), preservative free *Check age/dose* 10/07/2018, 11/01/2019    Flu vaccine, trivalent, preservative free, age 6 months and greater (Fluarix/Fluzone/Flulaval) 12/06/2013    Hepatitis B vaccine, adult *Check Product/Dose* 04/04/2008, 07/01/2008, 10/15/2008    Influenza, Unspecified 10/16/2007, 10/15/2008, 01/12/2010, 09/29/2011    Influenza, seasonal, injectable 08/28/2014, 09/11/2015    MMR vaccine, subcutaneous (MMR II) 07/01/2008    PPD Test 04/04/2008, 03/23/2009    Pfizer Purple Cap SARS-CoV-2 05/26/2021, 06/16/2021    Pneumococcal conjugate vaccine, 20-valent (PREVNAR 20) 10/17/2023    Pneumococcal polysaccharide vaccine, 23-valent, age 2 years and older (PNEUMOVAX 23) 02/27/2010, 10/18/2018    RSV, 60 Years And Older (AREXVY) 11/07/2023    Td (adult), unspecified  04/04/2008    Tdap vaccine, age 7 year and older (BOOSTRIX, ADACEL) 05/04/2012, 12/15/2016    Zoster vaccine, recombinant, adult (SHINGRIX) 11/01/2019, 03/11/2020, 03/29/2021       SOCIAL HISTORY  Smoking: Current smoker 22- current - 6 cigarettes a day - majority of the time 1ppd ~45 pack years   Alcohol: 4 drinks per week   Illicit drugs:  None     OCCUPATIONAL/ENVIRONMENTAL HISTORY  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     FAMILY HISTORY  Asthma - nieces/ nephews    RESULTS/DATA     Pulmonary Function Test Results     6/24/20 - FEV1/ FVC 0.83/ FEV1 1.36 (68%)/ FVC 1.63 (65%)/ TLC 2.90 (69%)/ DLCO 41%     11/10/21 - FEV1/FVC 0.79/ FEV1 1.45 (74% - no BD resp)/ FVC 1.85 (75%)/ TLC 3.16 (75%)/ DLCO 40%     5/22/24 - FEV1/FVC 0.73/ FEV1 1.26 (67%)/ FVC 1.72 (71%)/ TLC 3.23 (77%)/ DLCO 54%     6MWT: 6/24/20 - 480m - 95% on RA     Chest Radiograph     XR chest 2 views   Impression  No acute abnormalities.           Chest CT Scan     5/23/24 - There are smoking-related parenchymal changes with subpleural  reticulation and paraseptal emphysematous changes. Basilar opacities  most likely atelectatic. While smoking-related interstitial changes  can be considered, if there is concern for underlying interstitial  process, dedicated high-resolution imaging with prone positioning can  be performed for better evaluation of these basilar opacities.  2.  6 mm superior segment left lower lobe parenchymal lung nodule new  when compared to a study of 02/05/2021. While this may be post  inflammatory/atelectatic in nature, given the patient's history of  smoking, a three-month low-dose chest CT is recommended for  evaluation.    11/21/24 -  6 mm nodule in the left lower lobe is unchanged from the prior.  No new or suspicious nodules seen.  2. Redemonstration of tracheal diverticulum, similar to the prior.  3. Bibasilar atelectatic changes. Alternatively smoking related  interstitial changes  "are in the differential. Appearance is unchanged  from prior    Echocardiogram     9/2/21 -  The left ventricular systolic function is normal with a 65-70% estimated ejection fraction.   2. Slightly elevated RVSP. RA normal, RV normal size - low normal RV systolic function.     8/20/24 -  The left ventricular systolic function is normal, with a visually estimated ejection fraction of 60-65%.   2. Spectral Doppler shows an impaired relaxation pattern of left ventricular diastolic filling.   3. There is normal right ventricular global systolic function.   4. Trace mitral valve regurgitation.   5. Mild tricuspid regurgitation visualized.   6. RVSP within normal limits.   7. The estimated PASP is 30 mmHg.  RA normal size, RV normal size and function       Other testing/ Labs      Eosinophils Absolute (x10E9/L)   Date Value   01/01/2022 0.11     No results found for: \"IGE\"    REVIEW OF SYSTEMS     REVIEW OF SYSTEMS  Review of Systems   Constitutional:  Negative for fatigue and fever.   HENT:  Positive for postnasal drip and sinus pressure. Negative for congestion, rhinorrhea and voice change.    Eyes:  Negative for pain and visual disturbance.   Cardiovascular:  Negative for chest pain, palpitations and leg swelling.   Gastrointestinal:  Negative for abdominal pain, diarrhea, nausea and vomiting.   Endocrine: Negative for cold intolerance and heat intolerance.   Musculoskeletal:  Positive for back pain and myalgias. Negative for arthralgias and joint swelling.   Skin:  Negative for rash.   Neurological:  Negative for dizziness, weakness, numbness and headaches.   Psychiatric/Behavioral:  Negative for agitation. The patient is nervous/anxious.          PHYSICAL EXAM     VITAL SIGNS: There were no vitals taken for this visit.     CURRENT WEIGHT: [unfilled]  BMI: [unfilled]  PREVIOUS WEIGHTS:  Wt Readings from Last 3 Encounters:   11/14/24 83 kg (183 lb)   07/18/24 83.9 kg (185 lb)   06/04/24 84.5 kg (186 lb 3.2 oz) "       Physical Exam  Vitals reviewed.   Constitutional:       General: She is not in acute distress.     Appearance: Normal appearance. She is not ill-appearing or toxic-appearing.   HENT:      Head: Normocephalic.      Nose: No rhinorrhea.   Cardiovascular:      Rate and Rhythm: Normal rate and regular rhythm.      Heart sounds: Normal heart sounds.   Pulmonary:      Effort: Pulmonary effort is normal. No respiratory distress.      Breath sounds: Normal breath sounds. No stridor. No wheezing, rhonchi or rales.   Abdominal:      General: Abdomen is flat.   Musculoskeletal:         General: Normal range of motion.      Right lower leg: No edema.      Left lower leg: No edema.   Skin:     General: Skin is warm and dry.      Nails: There is no clubbing.   Neurological:      General: No focal deficit present.      Mental Status: She is alert and oriented to person, place, and time.   Psychiatric:         Mood and Affect: Mood normal.         Behavior: Behavior normal.         Judgment: Judgment normal.         ASSESSMENT/PLAN     Emphysema: PFTs without obstruction from 2021 - slightly restricted. CT with smoking related bronchiolitis and emphysema. Repeat PFTs with improvement. Does not like the the powder in advair   - continue symbicort 2 puffs twice daily - rinse mouth out afterwards   - continue albuterol HFA inhaler 2 puffs or albuterol nebulizer treatment every 4-6 hours as needed for shortness of breath     2. Smoking cessation:    - > 5 minutes smoking cessation counseling     3. Lung nodule: new 6mm nodule - qualifies to LCS, but following up on new nodule.   - will get CT chest in 11/2025 [phone]     4. CATALINA: has CPAP - having difficulties   - referral to sleep medicine - call [phone]    5. Allergic rhinitis:   - can use loratadine (claritin) 10mg daily or fluticasone (flonase) 1 spray each nostril 1-2 x per day - remember to aim towards your ear -- Loratadine you can buy over the counter      6.   Periapical facial abscess: s/p extractions/ antibiotics at [facility]   - make sure to follow up with dentistry as recommended     Thank you for visiting the Pulmonary clinic today!   Return to clinic 6 months  or sooner if needed   Shelby Sandoval CNP  My office -  [phone]- Jaclyn is my . Humera is my nurse [phone].   Radiology scheduling [phone]   Appointment scheduling [phone]   Pulmonary function testing - [phone]

## 2025-05-20 ENCOUNTER — APPOINTMENT (OUTPATIENT)
Dept: RADIOLOGY | Facility: HOSPITAL | Age: 70
End: 2025-05-20
Payer: COMMERCIAL

## 2025-05-20 ENCOUNTER — APPOINTMENT (OUTPATIENT)
Dept: SURGICAL ONCOLOGY | Facility: HOSPITAL | Age: 70
End: 2025-05-20
Payer: COMMERCIAL

## 2025-05-20 NOTE — PROGRESS NOTES
Nancy Samuel female   1955 69 y.o.   [id number]      Chief Complaint  New patient, abnormal breast imaging     History Of Present Illness  aNncy Samuel is a pleasant 69 y.o. AA female presenting for follow up on abnormal imaging. She denies breast biopsy or surgery. She denies family history breast cancer.      BREAST IMAGIN2023 Bilateral screening mammogram, BI-RADS Category 0, Bilateral breast masses, right breast asymmetry and left breast focal   Asymmetry. 3/7/2023  Bilateral diagnostic mammogram and ultrasound, BI-RADS Category 3,  RIGHT: 10:00 5 cm from the nipple, 0.4 x 0.5 x 0.3 cm, soft oval, circumscribed, hypoechoic mass,  probably benign and correlates with the right breast mass on mammogram. Left breast: 12:00 3 cm from the nipple, 0.7 x 0.8 x  0.3 cm an oval, circumscribed, hypoechoic mass is It is avascular and soft on elastography, It is probably benign and correlates with the left breast mass on mammogram. Interval resolution of right breast asymmetry and left breast focal asymmetry. 11/10/2023 Bilateral breast ultrasound, BI-RADS Category 3, Stable probably benign bilateral breast masses. Recommend a 2nd six-month follow-up bilateral breast ultrasound in May 2024. The patient is due for her annual screening mammography in 2024. 2024 Bilateral diagnostic mammogram, BI-RADS Category 3, Stable probably benign bilateral breast masses documenting 1 year of stability. Final short-term follow-up ultrasound in 1 year is recommended at the time of patient's bilateral annual mammogram. 2025 Bilateral diagnostic mammogram BI-RADS Category 2.              REPRODUCTIVE HISTORY: menarche age unknown, , first birth age 17, did not breastfeed ,no OCP's, scattered fibroglandular tissue                                    FAMILY CANCER HISTORY:   Father: Stomach cancer     Surgical History  She has a past surgical history that includes Spinal fusion (2014); Other surgical  history (11/01/2022); Other surgical history (11/01/2022); MR angio head wo IV contrast (12/6/2013); MR angio neck wo IV contrast (12/6/2013); and Other surgical history (02/27/2019).     Social History  She reports that she has been smoking cigarettes. She has a 7.5 pack-year smoking history. She has never used smokeless tobacco. She reports that she does not currently use alcohol. She reports current drug use. Drug: Marijuana.    Family History  Family History   Problem Relation Name Age of Onset    Other (Back Problems) Mother      Stomach cancer Father      Other (Neck Problems) Father      Heart disease Sister          Allergies  House dust, Iodine, Latex, Morphine, Penicillins, Propoxyphene, and Valsartan    Medications  Current Outpatient Medications   Medication Instructions    albuterol (Ventolin HFA) 90 mcg/actuation inhaler 2 puffs, inhalation, Every 4 hours PRN    albuterol 2.5 mg /3 mL (0.083 %) nebulizer solution USE 1 UNIT DOSE EVERY 4-6 HOURS AS NEEDED FOR WHEEZING .    amLODIPine (NORVASC) 2.5 mg, oral, Daily    aspirin 81 mg chewable tablet Chew.    atorvastatin (Lipitor) 40 mg tablet Take by mouth.    budesonide-formoterol (Symbicort) 80-4.5 mcg/actuation inhaler 2 puffs, inhalation, 2 times daily RT, Rinse mouth with water after use to reduce aftertaste and incidence of candidiasis. Do not swallow.    cyanocobalamin (Vitamin B-12) 50 mcg tablet 1 tablet, Daily    cyclobenzaprine (Flexeril) 10 mg tablet     diphenhydrAMINE (BENADRYL) 25 mg, 2 times daily PRN    docusate sodium (Colace) 100 mg capsule Take by mouth.    DULoxetine (Cymbalta) 60 mg DR capsule Take by mouth.    ferrous sulfate 325 (65 Fe) MG tablet Take by mouth.    fluticasone (Flonase) 50 mcg/actuation nasal spray 2 sprays, Each Nostril, Daily, Shake gently. Before first use, prime pump. After use, clean tip and replace cap.    folic acid (Folvite) 1 mg tablet Take by mouth.    hydroCHLOROthiazide (MICROZIDE) 12.5 mg, Daily     ipratropium-albuteroL (Duo-Neb) 0.5-2.5 mg/3 mL nebulizer solution Inhale.    loratadine (CLARITIN) 10 mg, oral, Daily, Take in the evening before bedtime    Lyrica 25 mg capsule 1 capsule, Every 24 hours    metoprolol succinate XL (Toprol-XL) 100 mg 24 hr tablet Take by mouth.    montelukast (SINGULAIR) 10 mg, oral, Nightly    Movantik 12.5 mg, Daily before breakfast    omeprazole (PriLOSEC) 40 mg DR capsule Take by mouth.    ranolazine (Ranexa) 500 mg 12 hr tablet Take by mouth.         REVIEW OF SYSTEMS    Constitutional:  Negative for appetite change, fatigue, fever and unexpected weight change.   HENT:  Negative for ear pain, hearing loss, nosebleeds, sore throat and trouble swallowing.    Eyes:  Negative for discharge, itching and visual disturbance.   Respiratory:  Negative for cough, chest tightness and shortness of breath.    Cardiovascular:  Negative for chest pain, palpitations and leg swelling.   Breast: as indicated in HPI  Gastrointestinal:  Negative for abdominal pain, constipation, diarrhea and nausea.   Endocrine: Negative for cold intolerance and heat intolerance.   Genitourinary:  Negative for dysuria, frequency, hematuria, pelvic pain and vaginal bleeding.   Musculoskeletal:  Negative for arthralgias, back pain, gait problem, joint swelling and myalgias.   Skin:  Negative for color change and rash.   Allergic/Immunologic: Negative for environmental allergies and food allergies.   Neurological:  Negative for dizziness, tremors, speech difficulty, weakness, numbness and headaches.   Hematological:  Does not bruise/bleed easily.   Psychiatric/Behavioral:  Negative for agitation, dysphoric mood and sleep disturbance. The patient is not nervous/anxious.         Past Medical History  She has a past medical history of Other disorders of lung, Personal history of other diseases of the circulatory system, Personal history of other diseases of the musculoskeletal system and connective tissue, Personal  history of other diseases of the respiratory system, Personal history of other mental and behavioral disorders, Personal history of other specified conditions, Personal history of other specified conditions, and Polyp of cervix uteri (11/04/2014).     Physical Exam  Patient is alert and oriented x3 and in a relaxed and appropriate mood. Her gait is steady and hand grasps are equal. Sclera is clear. The breasts are nearly symmetrical. The tissue is soft without palpable abnormalities, discrete nodules or masses. The skin and nipples appear normal. There is no cervical, supraclavicular or axillary lymphadenopathy. Heart rate and rhythm normal, S1 and S2 appreciated. The lungs are clear to auscultation bilaterally. Abdomen is soft and non-tender.       Physical Exam     Last Recorded Vitals  Vitals:    05/22/25 1015   BP: 136/70   Resp: 20   Temp: 36 °C (96.8 °F)         Relevant Results   Time was spent viewing digital images of the radiology testing with the patient. I explained the results in depth, along with suggested explanation for follow up recommendations based on the testing results. BI-RADS Category 2         Assessment/Plan       Stable clinical exam and imaging, no history breast biopsy or surgery, no family history breast cancer, scattered fibroglandular tissue.       Patient may return to annual screening mammograms. Next due 5/2026.       Patient Discussion/Summary  Your clinical examination and imaging are normal. You no longer need to be seen by a breast specialist for an annual physical breast examination. It is important to continue annual screening mammograms and breast exams through your primary care provider. Please return to see me if you have a new breast problem or abnormal mammogram. It has been a pleasure having you as a patient.     You can see your health information, review clinical summaries from office visits & test results online when you follow your health with MY  Chart, a personal  health record. To sign up go to [email or web address]. If you need assistance with signing up or trouble getting into your account call [employer] Patient Line 24/7 at [phone].    My office phone number is [phone] if you need to get in touch with me or have additional questions or concerns. Thank you for choosing [facility] and trusting me as your healthcare provider. I am honored to be a provider on your health care team and I remain dedicated to helping you achieve your health goals.       Aisha Salgado, APRN-CNP

## 2025-05-22 ENCOUNTER — HOSPITAL ENCOUNTER (OUTPATIENT)
Dept: RADIOLOGY | Facility: HOSPITAL | Age: 70
Discharge: HOME | End: 2025-05-22
Payer: COMMERCIAL

## 2025-05-22 ENCOUNTER — OFFICE VISIT (OUTPATIENT)
Dept: SURGICAL ONCOLOGY | Facility: HOSPITAL | Age: 70
End: 2025-05-22
Payer: COMMERCIAL

## 2025-05-22 ENCOUNTER — APPOINTMENT (OUTPATIENT)
Dept: SURGICAL ONCOLOGY | Facility: HOSPITAL | Age: 70
End: 2025-05-22
Payer: COMMERCIAL

## 2025-05-22 VITALS
RESPIRATION RATE: 20 BRPM | DIASTOLIC BLOOD PRESSURE: 70 MMHG | TEMPERATURE: 96.8 F | BODY MASS INDEX: 33.04 KG/M2 | HEIGHT: 63 IN | WEIGHT: 186.5 LBS | SYSTOLIC BLOOD PRESSURE: 136 MMHG

## 2025-05-22 DIAGNOSIS — R92.8 OTHER ABNORMAL AND INCONCLUSIVE FINDINGS ON DIAGNOSTIC IMAGING OF BREAST: ICD-10-CM

## 2025-05-22 DIAGNOSIS — R92.8 ABNORMAL FINDING ON BREAST IMAGING: ICD-10-CM

## 2025-05-22 PROCEDURE — 76983 USE EA ADDL TARGET LESION: CPT | Mod: 50

## 2025-05-22 PROCEDURE — 1125F AMNT PAIN NOTED PAIN PRSNT: CPT

## 2025-05-22 PROCEDURE — 1159F MED LIST DOCD IN RCRD: CPT

## 2025-05-22 PROCEDURE — 99213 OFFICE O/P EST LOW 20 MIN: CPT | Mod: 25

## 2025-05-22 PROCEDURE — 4004F PT TOBACCO SCREEN RCVD TLK: CPT

## 2025-05-22 PROCEDURE — 77062 BREAST TOMOSYNTHESIS BI: CPT

## 2025-05-22 PROCEDURE — 76642 ULTRASOUND BREAST LIMITED: CPT | Mod: 50

## 2025-05-22 PROCEDURE — 99213 OFFICE O/P EST LOW 20 MIN: CPT

## 2025-05-22 PROCEDURE — 3008F BODY MASS INDEX DOCD: CPT

## 2025-05-22 ASSESSMENT — PATIENT HEALTH QUESTIONNAIRE - PHQ9
SUM OF ALL RESPONSES TO PHQ9 QUESTIONS 1 & 2: 2
1. LITTLE INTEREST OR PLEASURE IN DOING THINGS: SEVERAL DAYS
2. FEELING DOWN, DEPRESSED OR HOPELESS: SEVERAL DAYS
10. IF YOU CHECKED OFF ANY PROBLEMS, HOW DIFFICULT HAVE THESE PROBLEMS MADE IT FOR YOU TO DO YOUR WORK, TAKE CARE OF THINGS AT HOME, OR GET ALONG WITH OTHER PEOPLE: SOMEWHAT DIFFICULT

## 2025-05-22 ASSESSMENT — PAIN SCALES - GENERAL: PAINLEVEL_OUTOF10: 6

## 2025-05-22 NOTE — PATIENT INSTRUCTIONS
Your clinical examination and imaging are normal. You no longer need to be seen by a breast specialist for an annual physical breast examination. It is important to continue annual screening mammograms and breast exams through your primary care provider. Please return to see me if you have a new breast problem or abnormal mammogram. It has been a pleasure having you as a patient.     You can see your health information, review clinical summaries from office visits & test results online when you follow your health with MY  Chart, a personal health record. To sign up go to [email or web address]. If you need assistance with signing up or trouble getting into your account call [employer] Patient Line 24/7 at [phone].    My office phone number is [phone] if you need to get in touch with me or have additional questions or concerns. Thank you for choosing [facility] and trusting me as your healthcare provider. I am honored to be a provider on your health care team and I remain dedicated to helping you achieve your health goals.

## 2025-06-13 ENCOUNTER — APPOINTMENT (OUTPATIENT)
Dept: SLEEP MEDICINE | Facility: CLINIC | Age: 70
End: 2025-06-13
Payer: COMMERCIAL

## 2025-07-09 ENCOUNTER — CLINICAL SUPPORT (OUTPATIENT)
Dept: EMERGENCY MEDICINE | Facility: HOSPITAL | Age: 70
End: 2025-07-09
Payer: COMMERCIAL

## 2025-07-09 LAB
ALBUMIN SERPL BCP-MCNC: 4.4 G/DL (ref 3.4–5)
ALP SERPL-CCNC: 81 U/L (ref 33–136)
ALT SERPL W P-5'-P-CCNC: 7 U/L (ref 7–45)
ANION GAP SERPL CALC-SCNC: 13 MMOL/L (ref 10–20)
AST SERPL W P-5'-P-CCNC: 15 U/L (ref 9–39)
ATRIAL RATE: 94 BPM
BASOPHILS # BLD AUTO: 0.04 X10*3/UL (ref 0–0.1)
BASOPHILS NFR BLD AUTO: 0.4 %
BILIRUB SERPL-MCNC: 0.3 MG/DL (ref 0–1.2)
BUN SERPL-MCNC: 19 MG/DL (ref 6–23)
CALCIUM SERPL-MCNC: 10.2 MG/DL (ref 8.6–10.6)
CHLORIDE SERPL-SCNC: 100 MMOL/L (ref 98–107)
CO2 SERPL-SCNC: 28 MMOL/L (ref 21–32)
CREAT SERPL-MCNC: 0.93 MG/DL (ref 0.5–1.05)
EGFRCR SERPLBLD CKD-EPI 2021: 67 ML/MIN/1.73M*2
EOSINOPHIL # BLD AUTO: 0.3 X10*3/UL (ref 0–0.7)
EOSINOPHIL NFR BLD AUTO: 2.8 %
ERYTHROCYTE [DISTWIDTH] IN BLOOD BY AUTOMATED COUNT: 14.7 % (ref 11.5–14.5)
GLUCOSE SERPL-MCNC: 110 MG/DL (ref 74–99)
HCT VFR BLD AUTO: 28.9 % (ref 36–46)
HGB BLD-MCNC: 9.2 G/DL (ref 12–16)
IMM GRANULOCYTES # BLD AUTO: 0.04 X10*3/UL (ref 0–0.7)
IMM GRANULOCYTES NFR BLD AUTO: 0.4 % (ref 0–0.9)
LYMPHOCYTES # BLD AUTO: 2.94 X10*3/UL (ref 1.2–4.8)
LYMPHOCYTES NFR BLD AUTO: 27.8 %
MAGNESIUM SERPL-MCNC: 1.94 MG/DL (ref 1.6–2.4)
MCH RBC QN AUTO: 31.5 PG (ref 26–34)
MCHC RBC AUTO-ENTMCNC: 31.8 G/DL (ref 32–36)
MCV RBC AUTO: 99 FL (ref 80–100)
MONOCYTES # BLD AUTO: 1.14 X10*3/UL (ref 0.1–1)
MONOCYTES NFR BLD AUTO: 10.8 %
NEUTROPHILS # BLD AUTO: 6.1 X10*3/UL (ref 1.2–7.7)
NEUTROPHILS NFR BLD AUTO: 57.8 %
NRBC BLD-RTO: 0 /100 WBCS (ref 0–0)
P AXIS: 43 DEGREES
P OFFSET: 212 MS
P ONSET: 169 MS
PLATELET # BLD AUTO: 452 X10*3/UL (ref 150–450)
POTASSIUM SERPL-SCNC: 3.9 MMOL/L (ref 3.5–5.3)
PR INTERVAL: 108 MS
PROT SERPL-MCNC: 8 G/DL (ref 6.4–8.2)
Q ONSET: 223 MS
QRS COUNT: 15 BEATS
QRS DURATION: 98 MS
QT INTERVAL: 340 MS
QTC CALCULATION(BAZETT): 425 MS
QTC FREDERICIA: 394 MS
R AXIS: 6 DEGREES
RBC # BLD AUTO: 2.92 X10*6/UL (ref 4–5.2)
SODIUM SERPL-SCNC: 137 MMOL/L (ref 136–145)
T AXIS: 11 DEGREES
T OFFSET: 393 MS
VENTRICULAR RATE: 94 BPM
WBC # BLD AUTO: 10.6 X10*3/UL (ref 4.4–11.3)

## 2025-07-09 PROCEDURE — 80053 COMPREHEN METABOLIC PANEL: CPT | Performed by: EMERGENCY MEDICINE

## 2025-07-09 PROCEDURE — 99285 EMERGENCY DEPT VISIT HI MDM: CPT | Performed by: EMERGENCY MEDICINE

## 2025-07-09 PROCEDURE — 36415 COLL VENOUS BLD VENIPUNCTURE: CPT | Performed by: EMERGENCY MEDICINE

## 2025-07-09 PROCEDURE — 85025 COMPLETE CBC W/AUTO DIFF WBC: CPT | Performed by: EMERGENCY MEDICINE

## 2025-07-09 PROCEDURE — 83735 ASSAY OF MAGNESIUM: CPT | Performed by: EMERGENCY MEDICINE

## 2025-07-09 PROCEDURE — 93005 ELECTROCARDIOGRAM TRACING: CPT

## 2025-07-09 ASSESSMENT — PAIN DESCRIPTION - LOCATION: LOCATION: LEG

## 2025-07-09 ASSESSMENT — PAIN SCALES - GENERAL: PAINLEVEL_OUTOF10: 8

## 2025-07-09 ASSESSMENT — PAIN - FUNCTIONAL ASSESSMENT: PAIN_FUNCTIONAL_ASSESSMENT: 0-10

## 2025-07-09 ASSESSMENT — PAIN DESCRIPTION - PAIN TYPE: TYPE: ACUTE PAIN

## 2025-07-10 ENCOUNTER — APPOINTMENT (OUTPATIENT)
Dept: RADIOLOGY | Facility: HOSPITAL | Age: 70
End: 2025-07-10
Payer: COMMERCIAL

## 2025-07-10 ENCOUNTER — HOSPITAL ENCOUNTER (EMERGENCY)
Facility: HOSPITAL | Age: 70
Discharge: HOME | End: 2025-07-10
Attending: EMERGENCY MEDICINE
Payer: COMMERCIAL

## 2025-07-10 ENCOUNTER — APPOINTMENT (OUTPATIENT)
Dept: VASCULAR MEDICINE | Facility: HOSPITAL | Age: 70
End: 2025-07-10
Payer: COMMERCIAL

## 2025-07-10 VITALS
RESPIRATION RATE: 16 BRPM | BODY MASS INDEX: 33.13 KG/M2 | HEIGHT: 63 IN | OXYGEN SATURATION: 93 % | WEIGHT: 187 LBS | HEART RATE: 111 BPM | DIASTOLIC BLOOD PRESSURE: 74 MMHG | SYSTOLIC BLOOD PRESSURE: 114 MMHG | TEMPERATURE: 97.3 F

## 2025-07-10 DIAGNOSIS — M79.89 OTHER SPECIFIED SOFT TISSUE DISORDERS: ICD-10-CM

## 2025-07-10 DIAGNOSIS — M79.89 PAIN AND SWELLING OF LEFT LOWER EXTREMITY: ICD-10-CM

## 2025-07-10 DIAGNOSIS — R42 DIZZINESS: ICD-10-CM

## 2025-07-10 DIAGNOSIS — J44.1 COPD EXACERBATION (MULTI): Primary | ICD-10-CM

## 2025-07-10 DIAGNOSIS — M79.605 PAIN AND SWELLING OF LEFT LOWER EXTREMITY: ICD-10-CM

## 2025-07-10 LAB
ANION GAP BLDV CALCULATED.4IONS-SCNC: 7 MMOL/L (ref 10–25)
BASE EXCESS BLDV CALC-SCNC: 5.9 MMOL/L (ref -2–3)
BODY TEMPERATURE: 37 DEGREES CELSIUS
CA-I BLDV-SCNC: 1.23 MMOL/L (ref 1.1–1.33)
CARDIAC TROPONIN I PNL SERPL HS: 4 NG/L (ref 0–34)
CARDIAC TROPONIN I PNL SERPL HS: 4 NG/L (ref 0–34)
CHLORIDE BLDV-SCNC: 102 MMOL/L (ref 98–107)
GLUCOSE BLDV-MCNC: 97 MG/DL (ref 74–99)
HCO3 BLDV-SCNC: 31.9 MMOL/L (ref 22–26)
HCT VFR BLD EST: 26 % (ref 36–46)
HGB BLDV-MCNC: 8.8 G/DL (ref 12–16)
INHALED O2 CONCENTRATION: 21 %
LACTATE BLDV-SCNC: 0.9 MMOL/L (ref 0.4–2)
OXYHGB MFR BLDV: 67.2 % (ref 45–75)
PCO2 BLDV: 54 MM HG (ref 41–51)
PH BLDV: 7.38 PH (ref 7.33–7.43)
PO2 BLDV: 43 MM HG (ref 35–45)
POTASSIUM BLDV-SCNC: 4.1 MMOL/L (ref 3.5–5.3)
SAO2 % BLDV: 70 % (ref 45–75)
SODIUM BLDV-SCNC: 137 MMOL/L (ref 136–145)

## 2025-07-10 PROCEDURE — 84132 ASSAY OF SERUM POTASSIUM: CPT | Mod: 59 | Performed by: STUDENT IN AN ORGANIZED HEALTH CARE EDUCATION/TRAINING PROGRAM

## 2025-07-10 PROCEDURE — 2500000002 HC RX 250 W HCPCS SELF ADMINISTERED DRUGS (ALT 637 FOR MEDICARE OP, ALT 636 FOR OP/ED): Performed by: STUDENT IN AN ORGANIZED HEALTH CARE EDUCATION/TRAINING PROGRAM

## 2025-07-10 PROCEDURE — 36415 COLL VENOUS BLD VENIPUNCTURE: CPT | Performed by: STUDENT IN AN ORGANIZED HEALTH CARE EDUCATION/TRAINING PROGRAM

## 2025-07-10 PROCEDURE — 84484 ASSAY OF TROPONIN QUANT: CPT | Performed by: STUDENT IN AN ORGANIZED HEALTH CARE EDUCATION/TRAINING PROGRAM

## 2025-07-10 PROCEDURE — 2500000004 HC RX 250 GENERAL PHARMACY W/ HCPCS (ALT 636 FOR OP/ED): Mod: JZ,TB | Performed by: STUDENT IN AN ORGANIZED HEALTH CARE EDUCATION/TRAINING PROGRAM

## 2025-07-10 PROCEDURE — 93971 EXTREMITY STUDY: CPT | Performed by: INTERNAL MEDICINE

## 2025-07-10 PROCEDURE — 96374 THER/PROPH/DIAG INJ IV PUSH: CPT | Performed by: EMERGENCY MEDICINE

## 2025-07-10 PROCEDURE — 2500000001 HC RX 250 WO HCPCS SELF ADMINISTERED DRUGS (ALT 637 FOR MEDICARE OP)

## 2025-07-10 PROCEDURE — 93971 EXTREMITY STUDY: CPT

## 2025-07-10 PROCEDURE — 94640 AIRWAY INHALATION TREATMENT: CPT

## 2025-07-10 PROCEDURE — 71046 X-RAY EXAM CHEST 2 VIEWS: CPT

## 2025-07-10 PROCEDURE — 71046 X-RAY EXAM CHEST 2 VIEWS: CPT | Performed by: RADIOLOGY

## 2025-07-10 RX ORDER — AZITHROMYCIN 250 MG/1
250 TABLET, FILM COATED ORAL DAILY
Qty: 4 TABLET | Refills: 0 | Status: SHIPPED | OUTPATIENT
Start: 2025-07-10 | End: 2025-07-14

## 2025-07-10 RX ORDER — KETOROLAC TROMETHAMINE 15 MG/ML
INJECTION, SOLUTION INTRAMUSCULAR; INTRAVENOUS
Status: DISPENSED
Start: 2025-07-10 | End: 2025-07-10

## 2025-07-10 RX ORDER — ALBUTEROL SULFATE 0.83 MG/ML
2.5 SOLUTION RESPIRATORY (INHALATION) ONCE
Status: DISCONTINUED | OUTPATIENT
Start: 2025-07-10 | End: 2025-07-10 | Stop reason: HOSPADM

## 2025-07-10 RX ORDER — IPRATROPIUM BROMIDE AND ALBUTEROL SULFATE 2.5; .5 MG/3ML; MG/3ML
3 SOLUTION RESPIRATORY (INHALATION) EVERY 20 MIN
Status: COMPLETED | OUTPATIENT
Start: 2025-07-10 | End: 2025-07-10

## 2025-07-10 RX ORDER — AZITHROMYCIN 500 MG/1
500 TABLET, FILM COATED ORAL ONCE
Status: COMPLETED | OUTPATIENT
Start: 2025-07-10 | End: 2025-07-10

## 2025-07-10 RX ORDER — KETOROLAC TROMETHAMINE 15 MG/ML
7.5 INJECTION, SOLUTION INTRAMUSCULAR; INTRAVENOUS ONCE
Status: COMPLETED | OUTPATIENT
Start: 2025-07-10 | End: 2025-07-10

## 2025-07-10 RX ORDER — DEXAMETHASONE 4 MG/1
4 TABLET ORAL ONCE
Qty: 1 TABLET | Refills: 0 | Status: SHIPPED | OUTPATIENT
Start: 2025-07-10 | End: 2025-07-10

## 2025-07-10 RX ORDER — ACETAMINOPHEN 325 MG/1
975 TABLET ORAL ONCE
Status: DISCONTINUED | OUTPATIENT
Start: 2025-07-10 | End: 2025-07-10 | Stop reason: HOSPADM

## 2025-07-10 RX ADMIN — IPRATROPIUM BROMIDE AND ALBUTEROL SULFATE 3 ML: .5; 3 SOLUTION RESPIRATORY (INHALATION) at 06:43

## 2025-07-10 RX ADMIN — DEXAMETHASONE 10 MG: 6 TABLET ORAL at 06:36

## 2025-07-10 RX ADMIN — KETOROLAC TROMETHAMINE 7.5 MG: 15 INJECTION, SOLUTION INTRAMUSCULAR; INTRAVENOUS at 07:33

## 2025-07-10 RX ADMIN — IPRATROPIUM BROMIDE AND ALBUTEROL SULFATE 3 ML: .5; 3 SOLUTION RESPIRATORY (INHALATION) at 06:36

## 2025-07-10 RX ADMIN — AZITHROMYCIN DIHYDRATE 500 MG: 500 TABLET ORAL at 14:34

## 2025-07-10 RX ADMIN — IPRATROPIUM BROMIDE AND ALBUTEROL SULFATE 3 ML: .5; 3 SOLUTION RESPIRATORY (INHALATION) at 06:54

## 2025-07-10 ASSESSMENT — PAIN - FUNCTIONAL ASSESSMENT: PAIN_FUNCTIONAL_ASSESSMENT: 0-10

## 2025-07-10 ASSESSMENT — PAIN SCALES - GENERAL
PAINLEVEL_OUTOF10: 8
PAINLEVEL_OUTOF10: 8

## 2025-07-10 NOTE — ED PROVIDER NOTES
Emergency Department Provider Note       History of Present Illness     History provided by: Patient  Limitations to History: None  External Records Reviewed with Brief Summary: None    HPI:  Nancy Samuel is a 70 y.o. female presenting with concerns of left leg and arm electrical pain.  Patient notes has been ongoing for about a week, she is not sure what caused it.  She also says she noticed a little bit of swelling to her left foot and ankle.  She also notes she has a past history of COPD, and feels like it is little harder for her to breathe.  She has kind of a throbbing pain beneath her left breast that hurts when you push on it.  She also notes she has had history of neck issues prior.  However is not had any these for years.  Denies fevers or chills, denies nausea or vomiting, denies passing out or feeling like she will pass out.    Physical Exam   Triage vitals:  T 36.6 °C (97.9 °F)  HR 71  /86  RR 18  O2 97 % None (Room air)    Physical Exam  Constitutional:       General: She is not in acute distress.     Appearance: Normal appearance. She is not ill-appearing.   HENT:      Head: Normocephalic and atraumatic.      Right Ear: External ear normal.      Left Ear: External ear normal.      Mouth/Throat:      Mouth: Mucous membranes are moist.   Eyes:      General:         Right eye: No discharge.         Left eye: No discharge.   Cardiovascular:      Rate and Rhythm: Normal rate.   Pulmonary:      Effort: Pulmonary effort is normal. No respiratory distress.      Comments: Diffusely coarse breath sounds with wheezes in all lung fields bilaterally  Musculoskeletal:         General: Normal range of motion.   Skin:     General: Skin is warm and dry.      Coloration: Skin is not jaundiced.      Comments: Slight swelling to the left lateral malleolus   Neurological:      Mental Status: She is alert and oriented to person, place, and time.   Psychiatric:         Mood and Affect: Mood normal.          Behavior: Behavior normal.           Medical Decision Making & ED Course   Medical Decision Makin y.o. female Presenting as per HPI, differential is broad and includes but not limited to COPD exacerbation, cervical myelopathy, ACS, pneumonia.   As a result, workup was ordered targeting these diagnoses including Xrays,  Blood work, and EKG.  Workup came back remarkable for nonactionable CBC and CMP, troponin and VBG as well as reassessment pending at time of signout, please see oncoming katherine's note for final disposition.   Interventions in the ED performed were: Decadron, DuoNebs, Tylenol, Toradol.  Patient signed out to oncoming provider, please see the note for final disposition.    ----    Differential diagnoses considered include but are not limited to: As per [facility]    Chronic conditions affecting the patient's care: As documented above in MDM    Care Considerations: As documented above in [facility]    ED Course:  ED Course as of 07/10/25 07   Thu Jul 10, 2025   07 EKG interpreted myself shows sinus rhythm, rate of 94, regular intervals, normal axis, no concerning ST elevations [SOPHIE]      ED Course User Index  [SOPHIE] Edward Velasquez DO       Disposition   Patient was signed out to Dr. Freitas at 0700 pending completion of their work-up.  Please see the next provider's transition of care note for the remainder of the patient's care.     Procedures   Procedures      Edward Velasquez DO  Emergency Medicine                                                       Edward Velasquez DO  Resident  07/10/25 0701

## 2025-07-10 NOTE — DISCHARGE INSTRUCTIONS
Please follow-up with your pain management provider for additional recommendations on your pain regimen.  Prescription for antibiotic and steroid has been sent to your pharmacy for COPD exacerbation.

## 2025-07-10 NOTE — ED TRIAGE NOTES
Patient is complaining of increased dizziness, increased swelling in in her bilateral feet on lasix. Patient states she is also having cramping pain in her left leg and upper left arm into her neck.

## 2025-07-10 NOTE — PROGRESS NOTES
Emergency Department Transition of Care Note       Signout   I received Nancy Samuel in signout from Dr. Velasquez.  Please see the ED Provider Note for all HPI, PE and MDM up to the time of signout at 0700.  This is in addition to the primary record.    In brief Nancy Samuel is an 70 y.o. female presenting for multiple medical complaints including left flank pain, electrical pain in the left arm and shortness of breath.    At the time of signout we were awaiting:  Second troponin, chest x-ray and reevaluation    ED Course & Medical Decision Making   Medical Decision Making:  Under my care, patient's labs are largely unremarkable.  On reexamination, she was continued to have wheezing in all lung fields, albuterol ordered.  Her shortness of breath has improved some.  Ultrasound of left lower extremity obtained as patient reporting swelling in that lower extremity, lower suspicion for DVT.  X-ray shows concern for possible right lower lung opacity that could be atelectasis or pneumonitis.  With her expiratory wheezing and concern for COPD exacerbation, will give patient azithromycin here in the ED and send prescription as well as steroids.  Patient reassessed again and expressing concern about continued chronic pain.  Given she follows with pain management, recommended following up with an outpatient for reexamination of her current medication regimen.  Walking pulse ox obtained and her pulse ox was between 94 and 95%.  Patient agreeable for discharge home at this time.    ED Course:  ED Course as of 07/10/25 1601   Thu Jul 10, 2025   0723 EKG interpreted myself shows sinus rhythm, rate of 94, regular intervals, normal axis, no concerning ST elevations [SOPHIE]   0800 Comprehensive metabolic panel(!)  No electrolyte abnormalities.  Kidney function stable.  Unremarkable. [AC]   0801 CBC and Auto Differential(!)  No leukocytosis.  Hemoglobin stable when compared to previous labs.  Platelets elevated at 452, when comparing  previous labs they have been elevated previously. [AC]   0801 MAGNESIUM: 1.94 [AC]   0901 On reassessment, patient is continuing to have wheezing in all lung fields.  She notes that her breathing does feel better.  Patient asking if we are getting ultrasound of left lower extremity as that is what she says she initially came in for.  When assessing left lower extremity, there is some minimal swelling to the extremity and tender to touch, no erythema noted.  Patient does have chronic pain for which she sees pain management. [AC]   0926 Troponin negative x 2. [AC]   0927 XR chest 2 views  IMPRESSION:  Faint right lower lung zone opacity which may represent atelectasis  and/or pneumonitis.  Follow up chest radiograph series in 4-6 weeks  time is recommended to reassess. Earlier repeat assessment can be  undertaken as clinically warranted.   [AC]      ED Course User Index  [AC] Rich Freitas DO  [SOPHIE] Edward Quinones DO         Diagnoses as of 07/10/25 1601   Dizziness   Pain and swelling of left lower extremity   COPD exacerbation (Multi)       Disposition   As a result of the work-up, the patient was discharged home.  she was informed of her diagnosis and instructed to come back with any concerns or worsening of condition.  she and was agreeable to the plan as discussed above.  she was given the opportunity to ask questions.  All of the patient's questions were answered.    Procedures   Procedures    Patient seen and discussed with ED attending physician.    Rich Freitas DO  Emergency Medicine  PGY-2

## 2025-08-19 ENCOUNTER — HOSPITAL ENCOUNTER (OUTPATIENT)
Dept: RADIOLOGY | Facility: CLINIC | Age: 70
Discharge: HOME | End: 2025-08-19
Payer: COMMERCIAL

## 2025-08-19 DIAGNOSIS — R52 PAIN: ICD-10-CM

## 2025-08-19 PROCEDURE — 72110 X-RAY EXAM L-2 SPINE 4/>VWS: CPT

## 2025-08-19 PROCEDURE — 72110 X-RAY EXAM L-2 SPINE 4/>VWS: CPT | Performed by: RADIOLOGY

## 2025-08-20 ENCOUNTER — APPOINTMENT (OUTPATIENT)
Facility: CLINIC | Age: 70
End: 2025-08-20
Payer: COMMERCIAL

## 2025-08-21 ENCOUNTER — HOSPITAL ENCOUNTER (OUTPATIENT)
Dept: RADIOLOGY | Facility: CLINIC | Age: 70
Discharge: HOME | End: 2025-08-21
Payer: COMMERCIAL

## 2025-08-21 DIAGNOSIS — R07.9 CHEST PAIN: ICD-10-CM

## 2025-08-21 PROCEDURE — 71046 X-RAY EXAM CHEST 2 VIEWS: CPT | Performed by: RADIOLOGY

## 2025-08-21 PROCEDURE — 71046 X-RAY EXAM CHEST 2 VIEWS: CPT

## 2025-09-15 ENCOUNTER — APPOINTMENT (OUTPATIENT)
Dept: PULMONOLOGY | Facility: HOSPITAL | Age: 70
End: 2025-09-15
Payer: COMMERCIAL

## 2025-12-29 ENCOUNTER — APPOINTMENT (OUTPATIENT)
Dept: PULMONOLOGY | Facility: HOSPITAL | Age: 70
End: 2025-12-29
Payer: COMMERCIAL